# Patient Record
Sex: FEMALE | Race: WHITE | NOT HISPANIC OR LATINO | Employment: FULL TIME | URBAN - METROPOLITAN AREA
[De-identification: names, ages, dates, MRNs, and addresses within clinical notes are randomized per-mention and may not be internally consistent; named-entity substitution may affect disease eponyms.]

---

## 2017-01-17 ENCOUNTER — OFFICE VISIT - RIVER FALLS (OUTPATIENT)
Dept: FAMILY MEDICINE | Facility: CLINIC | Age: 27
End: 2017-01-17

## 2017-01-17 ENCOUNTER — COMMUNICATION - RIVER FALLS (OUTPATIENT)
Dept: FAMILY MEDICINE | Facility: CLINIC | Age: 27
End: 2017-01-17

## 2017-01-17 ASSESSMENT — MIFFLIN-ST. JEOR: SCORE: 1681.58

## 2017-09-20 ENCOUNTER — OFFICE VISIT - RIVER FALLS (OUTPATIENT)
Dept: FAMILY MEDICINE | Facility: CLINIC | Age: 27
End: 2017-09-20

## 2017-09-20 ASSESSMENT — MIFFLIN-ST. JEOR: SCORE: 1692.47

## 2017-09-21 LAB
CHOLEST SERPL-MCNC: 205 MG/DL
CHOLEST/HDLC SERPL: 3.5 {RATIO}
HBA1C MFR BLD: 5 %
HDLC SERPL-MCNC: 59 MG/DL
LDLC SERPL CALC-MCNC: 130 MG/DL
NONHDLC SERPL-MCNC: 146 MG/DL
TRIGL SERPL-MCNC: 66 MG/DL

## 2018-01-10 ENCOUNTER — OFFICE VISIT - RIVER FALLS (OUTPATIENT)
Dept: FAMILY MEDICINE | Facility: CLINIC | Age: 28
End: 2018-01-10

## 2018-01-10 ASSESSMENT — MIFFLIN-ST. JEOR: SCORE: 1726.03

## 2018-10-17 ENCOUNTER — OFFICE VISIT - RIVER FALLS (OUTPATIENT)
Dept: FAMILY MEDICINE | Facility: CLINIC | Age: 28
End: 2018-10-17

## 2019-10-25 ENCOUNTER — AMBULATORY - RIVER FALLS (OUTPATIENT)
Dept: FAMILY MEDICINE | Facility: CLINIC | Age: 29
End: 2019-10-25

## 2019-11-21 ENCOUNTER — OFFICE VISIT - RIVER FALLS (OUTPATIENT)
Dept: FAMILY MEDICINE | Facility: CLINIC | Age: 29
End: 2019-11-21

## 2019-11-21 ASSESSMENT — MIFFLIN-ST. JEOR: SCORE: 1728.41

## 2020-07-24 ENCOUNTER — OFFICE VISIT - RIVER FALLS (OUTPATIENT)
Dept: FAMILY MEDICINE | Facility: CLINIC | Age: 30
End: 2020-07-24

## 2020-11-13 ENCOUNTER — AMBULATORY - RIVER FALLS (OUTPATIENT)
Dept: FAMILY MEDICINE | Facility: CLINIC | Age: 30
End: 2020-11-13

## 2021-05-14 ENCOUNTER — OFFICE VISIT - RIVER FALLS (OUTPATIENT)
Dept: FAMILY MEDICINE | Facility: CLINIC | Age: 31
End: 2021-05-14

## 2021-05-14 ASSESSMENT — MIFFLIN-ST. JEOR: SCORE: 1718.43

## 2021-05-17 ENCOUNTER — COMMUNICATION - RIVER FALLS (OUTPATIENT)
Dept: FAMILY MEDICINE | Facility: CLINIC | Age: 31
End: 2021-05-17

## 2021-05-17 ENCOUNTER — TRANSFERRED RECORDS (OUTPATIENT)
Dept: HEALTH INFORMATION MANAGEMENT | Facility: CLINIC | Age: 31
End: 2021-05-17

## 2021-05-17 LAB — HPV ABSTRACT: NORMAL

## 2022-02-11 VITALS
WEIGHT: 230.8 LBS | DIASTOLIC BLOOD PRESSURE: 88 MMHG | SYSTOLIC BLOOD PRESSURE: 142 MMHG | HEART RATE: 92 BPM | HEIGHT: 63 IN | TEMPERATURE: 99.6 F | BODY MASS INDEX: 40.89 KG/M2

## 2022-02-11 VITALS
HEIGHT: 63 IN | WEIGHT: 221 LBS | OXYGEN SATURATION: 99 % | DIASTOLIC BLOOD PRESSURE: 86 MMHG | SYSTOLIC BLOOD PRESSURE: 110 MMHG | TEMPERATURE: 98.7 F | BODY MASS INDEX: 39.16 KG/M2 | HEART RATE: 87 BPM

## 2022-02-11 VITALS
BODY MASS INDEX: 40.75 KG/M2 | TEMPERATURE: 98.1 F | OXYGEN SATURATION: 97 % | SYSTOLIC BLOOD PRESSURE: 134 MMHG | HEIGHT: 63 IN | DIASTOLIC BLOOD PRESSURE: 86 MMHG | HEART RATE: 106 BPM | WEIGHT: 230 LBS

## 2022-02-11 VITALS
SYSTOLIC BLOOD PRESSURE: 140 MMHG | WEIGHT: 232.2 LBS | TEMPERATURE: 99.2 F | HEART RATE: 97 BPM | DIASTOLIC BLOOD PRESSURE: 90 MMHG | BODY MASS INDEX: 41.13 KG/M2 | OXYGEN SATURATION: 98 %

## 2022-02-11 VITALS
SYSTOLIC BLOOD PRESSURE: 124 MMHG | DIASTOLIC BLOOD PRESSURE: 82 MMHG | WEIGHT: 232.2 LBS | BODY MASS INDEX: 41.14 KG/M2 | HEART RATE: 110 BPM | HEIGHT: 63 IN | TEMPERATURE: 99.3 F | OXYGEN SATURATION: 97 %

## 2022-02-11 VITALS
HEART RATE: 95 BPM | TEMPERATURE: 99.4 F | BODY MASS INDEX: 39.58 KG/M2 | SYSTOLIC BLOOD PRESSURE: 130 MMHG | HEIGHT: 63 IN | WEIGHT: 223.4 LBS | DIASTOLIC BLOOD PRESSURE: 88 MMHG

## 2022-02-11 VITALS
BODY MASS INDEX: 38.75 KG/M2 | OXYGEN SATURATION: 97 % | WEIGHT: 217 LBS | HEART RATE: 104 BPM | TEMPERATURE: 98.9 F | DIASTOLIC BLOOD PRESSURE: 80 MMHG | SYSTOLIC BLOOD PRESSURE: 140 MMHG

## 2022-02-16 NOTE — NURSING NOTE
Comprehensive Intake Entered On:  11/21/2019 4:19 PM CST    Performed On:  11/21/2019 4:16 PM CST by Sindy Villegas LPN               Summary   Chief Complaint :   here today for annual exam   Weight Measured :   232.2 lb(Converted to: 232 lb 3 oz, 105.32 kg)    Height Measured :   62.75 in(Converted to: 5 ft 3 in, 159.38 cm)    Body Mass Index :   41.46 kg/m2 (HI)    Body Surface Area :   2.16 m2   Systolic Blood Pressure :   124 mmHg   Diastolic Blood Pressure :   82 mmHg (HI)    Mean Arterial Pressure :   96 mmHg   Peripheral Pulse Rate :   110 bpm (HI)    BP Site :   Right arm   BP Method :   Manual   Temperature Tympanic :   99.3 DegF(Converted to: 37.4 DegC)    Oxygen Saturation :   97 %   Sindy Villegas LPN - 11/21/2019 4:16 PM CST   Health Status   Allergies Verified? :   Yes   Medication History Verified? :   Yes   Medical History Verified? :   No   Pre-Visit Planning Status :   Completed   Tobacco Use? :   Never smoker   Sindy Villegas LPN - 11/21/2019 4:16 PM CST   Meds / Allergies   (As Of: 11/21/2019 4:19:52 PM CST)   Allergies (Active)   No known allergies  Estimated Onset Date:   Unspecified ; Created By:   Sherin Flanagan CMA; Reaction Status:   Active ; Category:   Drug ; Substance:   No known allergies ; Type:   Allergy ; Updated By:   Sherin Flanagan CMA; Reviewed Date:   10/17/2018 4:02 PM CDT      No Known Medication Allergies  Estimated Onset Date:   Unspecified ; Created By:   Sherin Flanagan CMA; Reaction Status:   Active ; Category:   Drug ; Substance:   No Known Medication Allergies ; Type:   Allergy ; Updated By:   Sherin Flanagan CMA; Reviewed Date:   10/17/2018 4:02 PM CDT        Medication List   (As Of: 11/21/2019 4:19:52 PM CST)   Prescription/Discharge Order    loratadine  :   loratadine ; Status:   Prescribed ; Ordered As Mnemonic:   loratadine 10 mg oral capsule ; Simple Display Line:   10 mg, 1 cap(s), po, daily, 30 cap(s), 3 Refill(s) ; Ordering Provider:    Alana Higgins; Catalog Code:   loratadine ; Order Dt/Tm:   5/7/2018 6:40:48 PM CDT          albuterol  :   albuterol ; Status:   Prescribed ; Ordered As Mnemonic:   Ventolin HFA 90 mcg/inh inhalation aerosol ; Simple Display Line:   2 puff(s), inh, qid, she will call before picking up, 18 gm, 2 Refill(s) ; Ordering Provider:   Alana Higgins; Catalog Code:   albuterol ; Order Dt/Tm:   10/17/2018 4:25:54 PM CDT          budesonide-formoterol  :   budesonide-formoterol ; Status:   Prescribed ; Ordered As Mnemonic:   Symbicort 160 mcg-4.5 mcg/inh inhalation aerosol ; Simple Display Line:   See Instructions, INHALE TWO PUFFS BY MOUTH TWICE DAILY, 3 EA, 4 Refill(s) ; Ordering Provider:   Alana Higgins; Catalog Code:   budesonide-formoterol ; Order Dt/Tm:   10/17/2018 4:25:17 PM CDT            Home Meds    levonorgestrel  :   levonorgestrel ; Status:   Documented ; Ordered As Mnemonic:   Mirena 52 mg intrauteral device ; Simple Display Line:   52 mg, 1 EA, intrauteral, once, 0 Refill(s) ; Catalog Code:   levonorgestrel ; Order Dt/Tm:   3/25/2016 4:50:32 PM CDT

## 2022-02-16 NOTE — PROCEDURES
Accession Number:       [id number]  CLINICAL INFORMATION::     Intrauterine contraceptive device MIRENA  LMP::     IUD  PREV. PAP::     09/20/2017 WNL  PREV. BX::     NONE GIVEN  SOURCE::     None given  STATEMENT OF ADEQUACY::     Satisfactory for evaluation. Endocervical/transformation zone component present. Age and/or menstrual status not provided  INTERPRETATION/RESULT::     Negative for intraepithelial lesion or malignancy.  COMMENT::     This Pap test has been evaluated with computer assisted technology.  CYTOTECHNOLOGIST::     TONO TERESA(ASCP) CT Screening location: [place] [address]  COMMENT:     See comment       EXPLANATORY NOTE:         The Pap is a screening test for cervical cancer. It is       not a diagnostic test and is subject to false negative       and false positive results. It is most reliable when a       satisfactory sample, regularly obtained, is submitted       with relevant clinical findings and history, and when       the Pap result is evaluated along with historic and       current clinical information.  HPV mRNA E6/E7:     Not Detected       Methodology: Transcription-Mediated Amplification       This assay detects E6/E7 viral messenger RNA (mRNA) from 14       high-risk HPV types (16,18,31,33,35,39,45,51,52,56,58,59,66,68).           The analytical performance characteristics of this       assay have been determined by [employer].       The modifications have not been cleared or approved       by the FDA. This assay has been validated pursuant       to the CLIA regulations and is used for       clinical purposes.         For additional information, please refer to       [email or web address]/SIH106q1       (This link if provided for information/       educational purposes only.)

## 2022-02-16 NOTE — NURSING NOTE
Comprehensive Intake Entered On:  7/24/2020 12:56 PM CDT    Performed On:  7/24/2020 12:50 PM CDT by Claudia Gonsalves CMA               Summary   Chief Complaint :   c/o lumps under both breast present for a few months.     Weight Measured :   217.0 lb(Converted to: 217 lb 0 oz, 98.43 kg)    Systolic Blood Pressure :   140 mmHg (HI)    Diastolic Blood Pressure :   80 mmHg   Mean Arterial Pressure :   100 mmHg   Peripheral Pulse Rate :   104 bpm (HI)    BP Site :   Right arm   BP Method :   Manual   HR Method :   Electronic   Temperature Tympanic :   98.9 DegF(Converted to: 37.2 DegC)    Oxygen Saturation :   97 %   Claudia Gonsalves CMA - 7/24/2020 12:50 PM CDT   Health Status   Allergies Verified? :   Yes   Medication History Verified? :   Yes   Pre-Visit Planning Status :   N/A   Tobacco Use? :   Never smoker   Claudia Gonsalves CMA - 7/24/2020 12:50 PM CDT   Consents   Consent for Immunization Exchange :   Consent Granted   Consent for Immunizations to Providers :   Consent Granted   Claudia Gonsalves CMA - 7/24/2020 12:50 PM CDT   Problems   (As Of: 7/24/2020 12:56:05 PM CDT)   Problems(Active)    Asthma, mild persistent (SNOMED CT  :2183515216 )  Name of Problem:   Asthma, mild persistent ; Recorder:   Alana Higgins; Confirmation:   Confirmed ; Classification:   Medical ; Code:   [id number] ; Contributor System:   [employer] ; Last Updated:   10/17/2018 4:36 PM CDT ; Life Cycle Status:   Active ; Responsible Provider:   Alana Higgins; Vocabulary:   SNOMED CT        Seasonal allergies (SNOMED CT  :378846080 )  Name of Problem:   Seasonal allergies ; Recorder:   Traci Ceron; Confirmation:   Confirmed ; Classification:   Medical ; Code:   [id number] ; Contributor System:   [employer] ; Last Updated:   9/30/2015 12:26 PM CDT ; Life Cycle Date:   9/30/2015 ; Life Cycle Status:   Active ; Vocabulary:   SNOMED CT        Severe obesity (BMI >= 40) (SNOMED CT  :629998584 )  Name of Problem:   Severe obesity (BMI >= 40)  ; Recorder:   Alana Higgins; Confirmation:   Confirmed ; Classification:   Medical ; Code:   [id number] ; Contributor System:   [employer] ; Last Updated:   10/17/2018 4:36 PM CDT ; Life Cycle Status:   Active ; Responsible Provider:   Alana Higgins; Vocabulary:   SNOMED CT          Meds / Allergies   (As Of: 7/24/2020 12:56:05 PM CDT)   Allergies (Active)   No Known Medication Allergies  Estimated Onset Date:   Unspecified ; Created By:   Sherin Flanagan CMA; Reaction Status:   Active ; Category:   Drug ; Substance:   No Known Medication Allergies ; Type:   Allergy ; Updated By:   Sherin Flanagan CMA; Reviewed Date:   10/17/2018 4:02 PM CDT        Medication List   (As Of: 7/24/2020 12:56:05 PM CDT)   Prescription/Discharge Order    albuterol  :   albuterol ; Status:   Prescribed ; Ordered As Mnemonic:   Ventolin HFA 90 mcg/inh inhalation aerosol ; Simple Display Line:   2 puff(s), inh, qid, she will call before picking up, 18 gm, 3 Refill(s) ; Ordering Provider:   Alana Higgins; Catalog Code:   albuterol ; Order Dt/Tm:   11/21/2019 4:29:18 PM CST          budesonide-formoterol  :   budesonide-formoterol ; Status:   Prescribed ; Ordered As Mnemonic:   Symbicort 160 mcg-4.5 mcg/inh inhalation aerosol ; Simple Display Line:   See Instructions, INHALE TWO PUFFS BY MOUTH TWICE DAILY, 3 EA, 4 Refill(s) ; Ordering Provider:   Alana Higgins; Catalog Code:   budesonide-formoterol ; Order Dt/Tm:   11/21/2019 4:28:27 PM CST          loratadine  :   loratadine ; Status:   Prescribed ; Ordered As Mnemonic:   loratadine 10 mg oral capsule ; Simple Display Line:   10 mg, 1 cap(s), po, daily, 30 cap(s), 3 Refill(s) ; Ordering Provider:   Alana Higgins; Catalog Code:   loratadine ; Order Dt/Tm:   5/7/2018 6:40:48 PM CDT            Home Meds    levonorgestrel  :   levonorgestrel ; Status:   Documented ; Ordered As Mnemonic:   Mirena 52 mg intrauteral device ; Simple Display Line:   52 mg, 1 EA,  intrauteral, once, 0 Refill(s) ; Catalog Code:   levonorgestrel ; Order Dt/Tm:   3/25/2016 4:50:32 PM CDT            ID Risk Screen   Recent Travel History :   No recent travel   Family Member Travel History :   No recent travel   Other Exposure to Infectious Disease :   Unknown   Claudia Gonsalves CMA - 7/24/2020 12:50 PM CDT

## 2022-02-16 NOTE — LETTER
(Inserted Image. Unable to display)     November 23, 2020      ADARSH DELATORRE   [address] [place] [id number]          Dear ADARSH,      Thank you for selecting [facility] ([facility]viously [facility]) for your healthcare needs.    Our records indicate you are due for the following services:     Annual Physical and Gynecologic Exam ~ Yearly wellness exams are important for your ongoing health and wellness.  This exam gives you the opportunity to meet with your health care provider to review your health, update immunizations and to recommend preventive screenings that you may be due for.  At your wellness visit, your Healthcare Provider will determine the need for a gynecologic exam and/or Pap smear.    (FYI   Regarding office visits: In some instances, a video visit or telephone visit may be offered as an option.)      To schedule an appointment or if you have further questions, please contact your primary clinic:   [facility]       [phone]   [facility]       [phone]              [facility]     [phone]      Powered by [employer]    Sincerely,    VAISHALI Calix

## 2022-02-16 NOTE — PROGRESS NOTES
Chief Complaint    c/o lumps under both breast present for a few months.  History of Present Illness      rash started a few months ago      tried some over the counter creams without relief      no recent trips, no changes in laundry products or cleaning products,   no  new pets,   no contacts with people with similar condition,  no new lotions or creams,   no recent camping trips      it does not itch      it is not spreading      no fevers, chills, sore throat, runny nose, nausea, vomiting, constipation, no new skin lesions, chest pain, palpitations, slurred speech, new paresthesia, shortness of breath or wheeze.      located along the underside of both breasts      she also has seasonal allergies and would like a refill of her antihistamine      Review of systems is negative except as per HPI      Exam:      General: alert and oriented ×3 no acute distress.      HEENT: pupils are equal round and reactive to light extraocular motion is intact. Normocephalic and atraumatic.       Hearing is grossly normal and there is no otorrhea.       Nares are patent there is no rhinorrhea.       Mucous membranes are moist and pink.      Chest: has bilateral rise with no increased work of breathing.      Cardiovascular: normal perfusion and brisk capillary refill.      Musculoskeletal: no gross focal abnormalities and normal gait.      Neuro: no gross focal abnormalities and memory seems intact.      Psychiatric: speech is clear and coherent and fluent. Patient dressed appropriately for the weather. Mood is appropriate and affect is full.      Skin:      a few small furuncles at the pores on the underside of the breast, some scarring from previous furruncles      Education:  discussed with patient diagnosis.    also see assessment and plan below.   Patient should return to clinic if symptoms worsen or do not improve, and as needed for next annual exam.   Physical Exam   Vitals & Measurements    T: 98.9   F (Tympanic)  HR:  104(Peripheral)  BP: 140/80  SpO2: 97%     WT: 217.0 lb   Assessment/Plan       Folliculitis (N64.89)         Ordered:          clindamycin topical, 1 tessa, Topical, bid, # 60 mL, 11 Refill(s), Type: Maintenance, Pharmacy: [facility] #[id number], 1 tessa Topical bid, 62.75, in, 11/21/19 16:16:00 CST, Height Measured, 217, lb, 07/24/20 12:50:00 CDT, Weight Measured, (Ordered)          [id number] office outpatient visit 15 minutes (Charge), Quantity: 1, Folliculitis  Seasonal allergies                Seasonal allergies (J30.2)         Ordered:          27636 office outpatient visit 15 minutes (Charge), Quantity: 1, Folliculitis  Seasonal allergies                Orders:         loratadine, = 1 cap(s) ( 10 mg ), po, daily, # 30 cap(s), 3 Refill(s), Type: Hard Stop, Pharmacy: [facility] #[id number], (Completed)         loratadine, = 1 cap(s) ( 10 mg ), po, daily, # 90 cap(s), 3 Refill(s), Type: Maintenance, Pharmacy: [facility] #[id number], 1 cap(s) Oral daily, 62.75, in, 11/21/19 16:16:00 CST, Height Measured, 217, lb, 07/24/20 12:50:00 CDT, Weight Measured, (Ordered)      she will try topical antbiotics, if not effective she will rtc, also renewed claritin  Patient Information     Name:BHARGAV DELATORREMERCED LAWS      Address:      [address] [id number]     Sex:Female     YOB: 1990     [phone]     Emergency Contact:GARETT DELATORRE     MRN:[id number]     FIN:[id number]     Location:[facility]     Date of Service:07/24/2020      Primary Care Physician:       Alana Higgins, [phone]      Attending Physician:       Nahomi Silva MD, [phone]  Problem List/Past Medical History    Ongoing     Asthma, mild persistent     Seasonal allergies     Severe obesity (BMI >= 40)    Historical     Pregnancy  Procedure/Surgical History     Single live birth (03/19/2011)  Medications    clindamycin 1% topical solution, 1 tessa, Topical, bid, 11 refills     loratadine 10 mg oral capsule, 10 mg= 1 cap(s), Oral, daily, 3 refills    Mirena 52 mg intrauteral device, 52 mg= 1 EA, Intrauteral, once    Symbicort 160 mcg-4.5 mcg/inh inhalation aerosol, See Instructions, 4 refills    Ventolin HFA 90 mcg/inh inhalation aerosol, 2 puff(s), Inhale, qid, 3 refills  Allergies    No Known Medication Allergies  Social History    Smoking Status - 07/24/2020     Never smoker     Alcohol      Current, Beer (12 oz), 1-2 times per week, 1 drinks/episode average. 2 drinks/episode maximum. Ready to change: No., 11/22/2019     Employment/School      Employed, Work/School description: . Highest education level: Bachelors., 11/22/2019     Exercise      Exercise frequency: Occasional., 11/22/2019     Home/Environment      Marital status: . Spouse/Partner name: Aldo. Jose Enrique children. Living situation: Home/Independent. Injuries/Abuse/Neglect in household: No. Feels unsafe at home: No. Family/Friends available for support: Yes. Risks in environment: owns secured gun., 11/22/2019     Nutrition/Health      Type of diet: Regular. Wants to lose weight: Yes. Sleeping concerns: No. Feels highly stressed: No., 11/22/2019     Sexual      Sexually active: Yes. Identifies as female, Sexual orientation: Straight or heterosexual. History of STD: No. Contraceptive Use Details: Intrauterine device. History of sexual abuse: No., 11/22/2019     Substance Abuse      Never, 02/21/2012     Tobacco      Never, 02/21/2012  Family History    Alcohol Dependence: Mother.    Asthma: Daughter.    Cancer - unknown origin: Mother.    Father: History is negative    Sister: History is negative    Brother: History is negative  Immunizations      Vaccine Date Status          influenza virus vaccine, inactivated 10/25/2019 Given          influenza virus vaccine, inactivated 10/17/2018 Given          tetanus/diphth/pertuss (Tdap) adult/adol 08/27/2014 Given          tetanus/diphth/pertuss (Tdap) adult/adol 08/27/2014  Recorded          influenza 10/26/2010 Recorded          Td 07/27/2004 Recorded          MMR (measles/mumps/rubella) 08/28/2001 Recorded          OPV 08/23/1994 Recorded          DTaP 08/23/1994 Recorded          OPV 05/29/1991 Recorded          DTaP 05/29/1991 Recorded          Hib (PRP-T) 10/03/1990 Recorded          MMR (measles/mumps/rubella) 10/03/1990 Recorded          DTaP 1989 Recorded          OPV 1989 Recorded          DTaP 1989 Recorded          OPV 1989 Recorded          DTaP 1989 Recorded

## 2022-02-16 NOTE — PROCEDURES
Accession Number:       [id number]  CLINICAL INFORMATION::     None given  LMP::     06/01/2010  PREV. PAP::     8/27/2014  PREV. BX::     NONE GIVEN  SOURCE::     None given  STATEMENT OF ADEQUACY::     Satisfactory for evaluation. Endocervical/transformation zone component present.  INTERPRETATION/RESULT::     Negative for intraepithelial lesion or malignancy.  COMMENT::     This Pap test has been evaluated with computer assisted technology.  CYTOTECHNOLOGIST::     TONO JORDAN (ASCP) CT Screening location: [place] [address]

## 2022-02-16 NOTE — PROGRESS NOTES
Patient:   ADARSH DELATORRE            MRN: [id number]            FIN: [id number]               Age:   29 years     Sex:  Female     :  1990   Associated Diagnoses:   Well adult; Asthma, mild persistent; Seasonal allergies; Severe obesity (BMI >= 40)   Author:   Alana Higgins      Visit Information      Date of Service: 2019 03:54 pm  Performing Location: [facility]  Encounter#: [id number]      Chief Complaint   2019 4:16 PM CST   here today for annual exam        Well Adult History   Well Adult History             The patient presents for well adult exam, doing well\  , parenting 8 yr old daughter and works as   pap normal 2 years ago, will defer  had flu shot  has asthma, well controlled, uses Symbicort daily  ACT 21, see AAP, rreviewed today, hasnt used prednisone since childhood  still struggles with wt, has not gained this pas year, enjoyed water aerobics this summer.  The general health status is good.  The patient's diet is described as balanced.  Exercise: none.  Associated symptoms consist of none.  Last menstrual period: rare menses with IUD in place, was placed .  Medical encounters:.  Additional pertinent history: tobacco use none.        Review of Systems   Constitutional:  Negative except as documented in history of present illness.    Eye:  Negative except as documented in history of present illness.    Respiratory:  Negative except as documented in history of present illness.    Cardiovascular:  Negative except as documented in history of present illness.    Breast:  Negative.    Gastrointestinal:  Negative except as documented in history of present illness.    Genitourinary:  Negative except as documented in history of present illness.    Gynecologic:  Negative except as documented in history of present illness.    Hematology/Lymphatics:  Negative except as documented in history of present illness.    Endocrine:  Negative except as  documented in history of present illness.    Immunologic:  Negative except as documented in history of present illness.    Musculoskeletal:  Negative except as documented in history of present illness.    Integumentary:  Negative except as documented in history of present illness.    Neurologic:  Negative except as documented in history of present illness.    Psychiatric:  Negative except as documented in history of present illness.              Health Status   Allergies:    Allergic Reactions (Selected)  No known allergies  No Known Medication Allergies   Medications:  (Selected)   Prescriptions  Prescribed  Symbicort 160 mcg-4.5 mcg/inh inhalation aerosol: See Instructions, Instructions: INHALE TWO PUFFS BY MOUTH TWICE DAILY, # 3 EA, 4 Refill(s), Type: Soft Stop, Pharmacy: [facility] #[id number], INHALE TWO PUFFS BY MOUTH TWICE DAILY  Ventolin HFA 90 mcg/inh inhalation aerosol: 2 puff(s), inh, qid, Instructions: she will call before picking up, # 18 gm, 3 Refill(s), Type: Maintenance, Pharmacy: [facility] #[id number], 2 puff(s) Inhale qid,Instr:she will call before picking up  loratadine 10 mg oral capsule: 1 cap(s) ( 10 mg ), po, daily, # 30 cap(s), 3 Refill(s), Type: Maintenance, Pharmacy: [facility] #[id number], 1 cap(s) po daily  Documented Medications  Documented  Mirena 52 mg intrauteral device: 1 EA ( 52 mg ), intrauteral, once, 0 Refill(s), Type: Maintenance   Problem list:    All Problems  Asthma, mild persistent / SNOMED CT 5917453626 / Confirmed  Seasonal allergies / SNOMED CT 762393547 / Confirmed  Severe obesity (BMI >= 40) / SNOMED CT 010363178 / Confirmed  Resolved: Pregnancy / SNOMED CT 701257100  Canceled: Asthma / SNOMED CT 260212710  Canceled: Asthma, mild intermittent / SNOMED CT 7716378232  Canceled: Obesity / SNOMED CT 1171604524      Histories   Past Medical History:    Active  Seasonal allergies (281012990)  Resolved  Pregnancy (408830698):  Resolved on 3/19/2011 at 20 years.    Family History:    Mother: Selena  ()  Cause of Death: cancer  Age at Death: 34 years.   Alcohol Dependence  Cancer - unknown origin  Comments:  2016 2:18 PM CDT - Catalina Courtney  Leiomyosarcoma of uterus  Father: Jomar      History is negative.  Sister: Ally      History is negative.  Brother: Nick      History is negative.  Daughter: Agnieszka    Asthma     Procedure history:    Single live birth (SNOMED CT 258953172) on 3/19/2011 at 20 Years.   Social History:        Alcohol Assessment            Current, 1-2 times per week, 1 drinks/episode average.  2 drinks/episode maximum.      Tobacco Assessment            Never      Substance Abuse Assessment            Never      Employment and Education Assessment            Employed, Work/School description: .      Home and Environment Assessment            Marital status: .  Spouse/Partner name: Aldo.  Risks in environment: owns secured gun.      Nutrition and Health Assessment            Type of diet: Regular.      Exercise and Physical Activity Assessment            Exercise frequency: Never.      Sexual Assessment            Sexually active: Yes.  Sexual orientation: Heterosexual.  Other contraceptive use: IUD.        Physical Examination   Vital Signs   2019 4:16 PM CST Temperature Tympanic 99.3 DegF    Peripheral Pulse Rate 110 bpm  HI    Systolic Blood Pressure 124 mmHg    Diastolic Blood Pressure 82 mmHg  HI    Mean Arterial Pressure 96 mmHg    BP Site Right arm    BP Method Manual    Oxygen Saturation 97 %      Measurements from flowsheet : Measurements   2019 4:16 PM CST Height Measured - Standard 62.75 in    Weight Measured - Standard 232.2 lb    BSA 2.16 m2    Body Mass Index 41.46 kg/m2  HI      General:  Alert and oriented, No acute distress, vital signs stable, as noted above.    Eye:  Pupils are equal, round and reactive to light, Extraocular movements are intact, Normal conjunctiva.    HENT:  Normocephalic,  Tympanic membranes are clear, Normal hearing, Oral mucosa is moist, No pharyngeal erythema.    Neck:  Supple, Non-tender, No lymphadenopathy, No thyromegaly.    Respiratory:  Lungs are clear to auscultation, Respirations are non-labored, Breath sounds are equal, Symmetrical chest wall expansion.    Cardiovascular:  Normal rate, Regular rhythm, No murmur, No edema.    Breast:  No mass, No tenderness, No discharge, Breasts examined .  No infra nor supraclavicular nodes palpable.  No axillary nodes or masses palpable.  No nipple discharge. Breasts normal throughout.    Gastrointestinal:  Soft, Non-tender, Non-distended, No organomegaly.    Musculoskeletal:  Normal range of motion, Normal strength, No deformity, Normal gait.    Integumentary:  Warm, Dry, Pink, Intact, No rash.    Neurologic:  Alert, Oriented, Normal sensory, Normal motor function, Cranial Nerves II-XII are grossly intact.    Psychiatric:  Cooperative, Appropriate mood & affect, Normal judgment, PHQ 9/CAGE questionaire reviewed and discussed with patient,  see score.       Impression and Plan   Diagnosis     Well adult (JJL27-YB Z00.00).     Asthma, mild persistent (SKP92-LO J45.30).     Seasonal allergies (FSF14-EC J30.2).     Severe obesity (BMI >= 40) (GKX47-JD E66.01).     Patient Instructions:       Counseled: Patient, Regarding diagnosis, Regarding medications, Verbalized understanding, counseled on health benefits of healthy weight, regular exercise, healthy diet, additional 10 min in counseling regarding asthma.    Orders     Orders (Selected)   Prescriptions  Prescribed  Symbicort 160 mcg-4.5 mcg/inh inhalation aerosol: See Instructions, Instructions: INHALE TWO PUFFS BY MOUTH TWICE DAILY, # 3 EA, 4 Refill(s), Type: Soft Stop, Pharmacy: [facility] #[id number], INHALE TWO PUFFS BY MOUTH TWICE DAILY  Ventolin HFA 90 mcg/inh inhalation aerosol: 2 puff(s), inh, qid, Instructions: she will call before picking up, # 18 gm, 3 Refill(s), Type:  Maintenance, Pharmacy: [facility] #[id number], 2 puff(s) Inhale qid,Instr:she will call before picking up.

## 2022-02-16 NOTE — NURSING NOTE
Asthma Control Test (ACT) Total Entered On:  5/14/2021 3:18 PM CDT    Performed On:  5/14/2021 3:16 PM CDT by Sindy Villegas LPN               Asthma Control Test (ACT) Total   Asthma Control Test Total (Adult) :   22    Asthma Action Plan Provided? :   No   Sindy Villegas LPN - 5/14/2021 3:16 PM CDT

## 2022-02-16 NOTE — TELEPHONE ENCOUNTER
Entered by Aletha Soler CMA on November 30, 2020 9:42:25 AM CST  ---------------------  From: Aletha Soler CMA   To: [facility] #[id number]    Sent: 11/30/2020 9:42:25 AM CST  Subject: Medication Management     ** Submitted: **  Order:budesonide-formoterol (Symbicort 160 mcg-4.5 mcg/inh inhalation aerosol)  See Instructions  INHALE 2 PUFFS BY MOUTH TWICE DAILY  Qty:  30.6 gm        Days Supply:  90        Refills:  0          Substitutions Allowed     Route To [facility] #[id number]    Signed by Aletha Soler CMA  11/30/2020 3:41:00 PM [facility]    ** Submitted: **  Complete:budesonide-formoterol (Symbicort 160 mcg-4.5 mcg/inh inhalation aerosol)   Signed by Aletha Soler CMA  11/30/2020 3:42:00 PM [facility]    ** Not Approved:  **  budesonide-formoterol (SYMBICORT 160/4.5MCG (120 ORAL INH))  INHALE 2 PUFFS BY MOUTH TWICE DAILY  Qty:  30.6 gm        Days Supply:  90        Refills:  0          Substitutions Allowed     Route To [facility] #[id number]   Signed by Aletha Soler CMA            ------------------------------------------  From: [facility] #[id number]  To: Alana Higgins  Sent: November 27, 2020 4:52:46 PM CST  Subject: Medication Management  Due: November 26, 2020 4:59:30 PM CST     ** On Hold Pending Signature **     Dispensed Drug: budesonide-formoterol (Symbicort 160 mcg-4.5 mcg/inh inhalation aerosol), INHALE 2 PUFFS BY MOUTH TWICE DAILY  Quantity: 30.6 gm  Days Supply: 90  Refills: 0  Substitutions Allowed  Notes from Pharmacy:  ------------------------------------------1 month refill sent per protocol. Pt was mailed reminder letter 11/23/20.  Aletha Soler CMA.

## 2022-02-16 NOTE — NURSING NOTE
Asthma Control Test (ACT) Total Entered On:  11/21/2019 5:00 PM CST    Performed On:  11/21/2019 5:00 PM CST by Sindy Villegas LPN               Asthma Control Test (ACT) Total   Asthma Control Test Total (Adult) :   21    Asthma Action Plan Provided? :   Yes   Sindy Villegas LPN - 11/21/2019 5:00 PM CST

## 2022-02-16 NOTE — PROGRESS NOTES
Patient:   ADARSH DELATORRE            MRN: [id number]            FIN: [id number]               Age:   28 years     Sex:  Female     :  1990   Associated Diagnoses:   Well adult; Asthma, mild persistent; Severe obesity (BMI >= 40)   Author:   Alana Higgins      Chief Complaint   10/17/2018 3:59 PM CDT   px,        Well Adult History   Well Adult History             The patient presents for well adult exam, , parenting 8 yr old  happy with Mirena  pap can be deferred  weight climbing, bp elevated  asthma in great control, see ACT and AAP, she has great understanding of how to keep controlled  flu shot today.  The general health status is good.  The patient's diet is described as balanced.  Exercise: none.  Associated symptoms consist of weight gain.  Last menstrual period: mirena, declines string check.  Medical encounters:.  Additional pertinent history: tobacco use none.        Review of Systems   Constitutional:  Negative except as documented in history of present illness.    Eye:  Negative except as documented in history of present illness.    Respiratory:  Negative except as documented in history of present illness.    Cardiovascular:  Negative except as documented in history of present illness.    Breast:  Negative.    Gastrointestinal:  Negative except as documented in history of present illness.    Genitourinary:  Negative except as documented in history of present illness.    Gynecologic:  Negative except as documented in history of present illness.    Hematology/Lymphatics:  Negative except as documented in history of present illness.    Endocrine:  Negative except as documented in history of present illness.    Immunologic:  Negative except as documented in history of present illness.    Musculoskeletal:  Negative except as documented in history of present illness.    Integumentary:  Negative except as documented in history of present illness.    Neurologic:  Negative except as  documented in history of present illness.    Psychiatric:  Negative except as documented in history of present illness.              Health Status   Allergies:    Allergic Reactions (Selected)  No known allergies  No Known Medication Allergies   Medications:  (Selected)   Prescriptions  Prescribed  Symbicort 160 mcg-4.5 mcg/inh inhalation aerosol: See Instructions, Instructions: INHALE TWO PUFFS BY MOUTH TWICE DAILY, # 3 EA, 4 Refill(s), Type: Soft Stop, Pharmacy: [facility] #[id number], INHALE TWO PUFFS BY MOUTH TWICE DAILY  Ventolin HFA 90 mcg/inh inhalation aerosol: 2 puff(s), inh, qid, Instructions: she will call before picking up, # 18 gm, 2 Refill(s), Type: Maintenance, Pharmacy: [facility] #[id number], 2 puff(s) Inhale qid,Instr:she will call before picking up  loratadine 10 mg oral capsule: 1 cap(s) ( 10 mg ), po, daily, # 30 cap(s), 3 Refill(s), Type: Maintenance, Pharmacy: [facility] #[id number], 1 cap(s) po daily  Documented Medications  Documented  Mirena 52 mg intrauteral device: 1 EA ( 52 mg ), intrauteral, once, 0 Refill(s), Type: Maintenance   Problem list:    All Problems  Obesity / SNOMED CT 1230152929 / Probable  Seasonal allergies / SNOMED CT 117546713 / Confirmed  Asthma, mild intermittent / SNOMED CT 3675536712 / Confirmed  Resolved: Pregnancy / SNOMED CT 385788006  Canceled: Asthma / SNOMED CT 661609817      Histories   Past Medical History:    Active  Obesity (0984272978)  Seasonal allergies (134264602)  Resolved  Pregnancy (121002547):  Resolved on 3/19/2011 at 20 years.   Family History:    Mother: Selena  ()  Cause of Death: cancer  Age at Death: 34 years.   Alcohol Dependence  Cancer - unknown origin  Comments:  2016 2:18 PM - Catalina Courtney  Leiomyosarcoma of uterus  Father: Jomar      History is negative.  Sister: Ally      History is negative.  Brother: Nick      History is negative.  Daughter: Agnieszka    Asthma     Procedure history:    Single live birth (SNOMED CT  268806513) on 3/19/2011 at 20 Years.      Physical Examination   Vital Signs   10/17/2018 3:59 PM CDT Temperature Tympanic 99.2 DegF    Peripheral Pulse Rate 97 bpm    HR Method Electronic    Systolic Blood Pressure 140 mmHg  HI    Diastolic Blood Pressure 90 mmHg  HI    Mean Arterial Pressure 107 mmHg    BP Site Right arm    BP Method Manual    Oxygen Saturation 98 %      Measurements from flowsheet : Measurements   10/17/2018 3:59 PM CDT   Weight Measured - Standard                232.2 lb     General:  Alert and oriented, No acute distress, vital signs stable, as noted above.    Eye:  Pupils are equal, round and reactive to light, Extraocular movements are intact, Normal conjunctiva.    HENT:  Normocephalic, Tympanic membranes are clear, Normal hearing, Oral mucosa is moist, No pharyngeal erythema.    Neck:  Supple, Non-tender, No lymphadenopathy, No thyromegaly.    Respiratory:  Lungs are clear to auscultation, Respirations are non-labored, Breath sounds are equal, Symmetrical chest wall expansion.    Cardiovascular:  Normal rate, Regular rhythm, No murmur, No edema.    Gastrointestinal:  Soft, Non-tender, Non-distended, No organomegaly.    Musculoskeletal:  Normal range of motion, Normal strength, No deformity, Normal gait.    Integumentary:  Warm, Dry, Pink, Intact, No rash.    Neurologic:  Alert, Oriented, Normal sensory, Normal motor function, Cranial Nerves II-XII are grossly intact.    Psychiatric:  Cooperative, Appropriate mood & affect, Normal judgment, PHQ 9/CAGE questionaire reviewed and discussed with patient,  see score.       Impression and Plan   Diagnosis     Well adult (ETT74-IH Z00.00).     Asthma, mild persistent (PAY85-SI J45.30).     Severe obesity (BMI >= 40) (CYI72-DT E66.01).     Patient Instructions:       Counseled: Patient, Regarding diagnosis, Regarding medications, Verbalized understanding, counseled on health benefits of healthy weight, regular exercise, healthy diet, i asked her to  work on carb reduction, her  cooks and diet seems healthy but will reduce carbs and portions  Record bp over next month and bring in readings to make sure it does stay elevated, reduce sodium.    Orders     Orders (Selected)   Prescriptions  Prescribed  Symbicort 160 mcg-4.5 mcg/inh inhalation aerosol: See Instructions, Instructions: INHALE TWO PUFFS BY MOUTH TWICE DAILY, # 3 EA, 4 Refill(s), Type: Soft Stop, Pharmacy: [facility] #[id number], INHALE TWO PUFFS BY MOUTH TWICE DAILY  Ventolin HFA 90 mcg/inh inhalation aerosol: 2 puff(s), inh, qid, Instructions: she will call before picking up, # 18 gm, 2 Refill(s), Type: Maintenance, Pharmacy: [facility] #[id number], 2 puff(s) Inhale qid,Instr:she will call before picking up.

## 2022-02-16 NOTE — NURSING NOTE
CAGE Assessment Entered On:  5/14/2021 3:15 PM CDT    Performed On:  5/14/2021 3:15 PM CDT by Sindy Villegas LPN               Assessment   Have you ever felt you should cut down on your drinking :   No   Have people annoyed you by criticizing your drinking :   No   Have you ever felt bad or guilty about your drinking :   No   Have you ever taken a drink first thing in the morning to steady your nerves or get rid of a hangover (Eye-opener) :   No   CAGE Score :   0    Sindy Villegas LPN - 5/14/2021 3:15 PM CDT

## 2022-02-16 NOTE — PROGRESS NOTES
Patient:   ADARSH DELATORRE            MRN: [id number]            FIN: [id number]               Age:   26 years     Sex:  Female     :  1990   Associated Diagnoses:   None   Author:   Yash Montelongo MD      Visit Information      Date of Service: 2017 10:56 am  Performing Location: [facility]  Encounter#: [id number]      Primary Care Provider (PCP):  Catalina Courtney MD    NPI# [id number]      Referring Provider:  No referring provider recorded for selected visit.      Chief Complaint   2017 11:03 AM CST   Pt c/o sore throat, fever and cough.        History of Present Illness   CC as above and reviewed w  patient   Pt has had above symptoms for several days.       Review of Systems         Resp - no difficulty breathing      Health Status   Allergies:    Allergic Reactions (Selected)  No known allergies  No Known Medication Allergies   Medications:  (Selected)   Prescriptions  Prescribed  Ventolin HFA 90 mcg/inh inhalation aerosol: 2 puff(s), inh, qid, # 18 gm, 2 Refill(s), Type: Maintenance, Pharmacy: [facility] #[id number], 2 puff(s) inh qid  budesonide-formoterol 160 mcg-4.5 mcg/inh inhalation aerosol: 2 puff(s), inh, bid, # 1 EA, 11 Refill(s), Type: Maintenance, Pharmacy: [facility] #[id number], 2 puff(s) inh bid  loratadine 10 mg oral capsule: 1 cap(s) ( 10 mg ), po, daily, # 30 cap(s), 11 Refill(s), Type: Maintenance, Pharmacy: [facility] #[id number], 1 cap(s) po daily  Documented Medications  Documented  Mirena 52 mg intrauteral device: 1 EA ( 52 mg ), intrauteral, once, 0 Refill(s), Type: Maintenance      Histories   Family History:    Asthma  Daughter (Agnieszka)  Cancer - unknown origin  Mother (Selena, )  Comments:  2016 2:18 PM - Catalina Courtney MD  Leiomyosarcoma of uterus  Alcohol Dependence  Mother (Selena, )     Social History:        Alcohol Assessment: Current            1-2 times per week                     Comments:                      10/07/2016 -  Traci Ceron                     2-3 [facility]      Tobacco Assessment: Denies Tobacco Use            Never      Substance Abuse Assessment: Denies Substance Abuse            Never      Employment and Education Assessment            Employed, Work/School description: .      Home and Environment Assessment            Marital status: .  Spouse/Partner name: Aldo.      Exercise and Physical Activity Assessment: Does not exercise      Sexual Assessment            Sexually active: Yes.  Sexual orientation: Heterosexual.  Other contraceptive use: IUD.        Physical Examination   Vital Signs   1/17/2017 11:03 AM CST Temperature Tympanic 98.7 DegF    Peripheral Pulse Rate 87 bpm    Systolic Blood Pressure 110 mmHg    Diastolic Blood Pressure 86 mmHg    Mean Arterial Pressure 94 mmHg    Oxygen Saturation 99 %      Measurements from flowsheet : Measurements   1/17/2017 11:03 AM CST Height Measured - Standard 63 in    Weight Measured - Standard 221 lb    BSA 2.11 m2    Body Mass Index 39.14 kg/m2      Gen - appears well  ENT: External auditory canals clear. TMs normal bilaterally. Normal oropharynx. Moist mucus membranes   Neck - supple w/o adenopathy    CV: RRR w/o MRG. Normal S1 and S2   Resp: Clear to auscultation b/l. Normal breath sounds without wheezes or crackles. No increased work of breathing.       Review / Management   Results review:  Lab results: 1/17/2017 11:13 AM CST   Rapid Strep A             Negative  .       Impression and Plan   Common cold - supportive care

## 2022-02-16 NOTE — PROGRESS NOTES
Patient:   ADARSH DELATORRE            MRN: [id number]            FIN: [id number]               Age:   30 years     Sex:  Female     :  1990   Associated Diagnoses:   Asthma, mild persistent; Encounter for IUD removal; Screening for cervical cancer   Author:   Alana Higgins      Procedure   Pap smear procedure   Date/ Time:  2021 2:46:00 PM.     Confirmed: patient, procedure, safety procedures followed.     Performed by: Alana Higgisn, she would like Mirena replaced, has been a little over 5 years, this will be her 3rd Mirena. her child is 10 years old and she desires continued contraception . . no menses with Mirena. Can feel strings when she checks. Due for pap, have always been normal. use/risk/benefits reviewed.     Informed consent: signed by patient.     Indication: Would like To proceed with IUD insertion.  Reviewed with pt today the risks and benefits of procedure. Risks include infection, uterine perforation, expulsion , embedment in the myometrium and discomfort, .  Common side effects include vaginal bleeding alterations, amenorrhea, intermenstrual bleeding and spotting, abdominal/pelvic discomfort.      Pregnancy is ruled out: IUD in place    With the patient in the lithotomy position, her external genitalia is normal. A bimanual exam is performed to locate the position and size of the uterus and there are no pelvic contraindications.   A sterile speculum is used to enter the vaginal canal.  The os is visualized.  A PAP SMEAR IS OBTAINED> IUD strings are visualized and with steady tension with ring forcepts the IUD is easily removed and fully intact and discarded.   Three sterile Betadine soaked gauze are used to clean the os thoroughly.  The tenaculum is slowly applied near the lip of the cervix  and gentle traction is applied to align the cervical canal with the uterine cavity.  A sterile uterine sound is inserted to check the patency of the cervix, measure the depth of the  uterine cavity, confirm its direction and exclude the presence of any uterine anomaly.  The uterus sounded to ______8.0 cm_________.     The IUD package was open,and the device is loaded into the insertion tube.   The tenaculum is grasped with one hand and with the other hand the insertion tube is gently advance through the cervical canal according to insertion guidelines.   The insertion tube was removed and threads were cut perpendicular with a sterile scissors, leaving about 3 cm visible outside the cervix.   Instruments removed.    The patient tolerated the procedure well and had no acute pain. She sat up and was monitored and given the the patient Follow-up Reminder Card  and was asked to make an appointment in 4-12 weeks to be re evaluated. Reminded regarding bleeding pattern expectations, need for condoms if not in a monogamous relationship and the need for yearly  exams.  .     Procedure tolerated: well.     Complications: none.       She also asked that I refill her asthma meds. Her ACT score is over 20, asthma is well controlled with treating seasonal allergies with loratadine and daily symbicort use. See ACT and AAP      Impression and Plan   Diagnosis     Encounter for IUD insertion (UQJ66-SC Z30.430).     Asthma, mild persistent (FAJ24-DR J45.30).     Encounter for IUD removal (SPO15-SI Z30.432).     Screening for cervical cancer (PGY62-PG Z12.4).     Orders     Orders (Selected)   Outpatient Orders  Ordered (In Transit)  ThinPrep Imaging Pap and HPV mRNA E6/E7* ([facility]): Specimen Type: Pap, Collection Date: 05/14/21 14:18:00 CDT  Prescriptions  Prescribed  Symbicort 160 mcg-4.5 mcg/inh inhalation aerosol: See Instructions, Instructions: INHALE 2 PUFFS BY MOUTH TWICE DAILY, # 3 EA, 3 Refill(s), Type: Maintenance, Pharmacy: [facility] #[id number], INHALE 2 PUFFS BY MOUTH TWICE DAILY, 62.75, in, 05/14/21 13:59:00 CDT, Height Measured, 230, lb, 05/14/...  Ventolin HFA 90 mcg/inh inhalation aerosol: 2  puff(s), inh, qid, # 18 gm, 3 Refill(s), Type: Maintenance, Pharmacy: [facility] #[id number], 2 puff(s) Inhale qid, 62.75, in, 05/14/21 13:59:00 CDT, Height Measured, 230, lb, 05/14/21 13:59:00 CDT, Weight Measured  loratadine 10 mg oral capsule: = 1 cap(s) ( 10 mg ), po, daily, # 90 cap(s), 3 Refill(s), Type: Maintenance, Pharmacy: [facility] #[id number], 1 cap(s) Oral daily, 62.75, in, 05/14/21 13:59:00 CDT, Height Measured, 230, lb, 05/14/21 13:59:00 CDT, Weight Measured.

## 2022-02-16 NOTE — NURSING NOTE
Comprehensive Intake Entered On:  5/14/2021 2:03 PM CDT    Performed On:  5/14/2021 1:59 PM CDT by Sindy Villegas LPN               Summary   Chief Complaint :   here for IUD removal and replacement, originally placed 3/25/2016   Weight Measured :   230 lb(Converted to: 230 lb 0 oz, 104.326 kg)    Height Measured :   62.75 in(Converted to: 5 ft 3 in, 159.38 cm)    Body Mass Index :   41.06 kg/m2 (HI)    Body Surface Area :   2.15 m2   Systolic Blood Pressure :   134 mmHg (HI)    Diastolic Blood Pressure :   86 mmHg (HI)    Mean Arterial Pressure :   102 mmHg   Peripheral Pulse Rate :   106 bpm (HI)    BP Site :   Right arm   BP Method :   Manual   Temperature Tympanic :   98.1 DegF(Converted to: 36.7 DegC)    Oxygen Saturation :   97 %   Sindy Villegas LPN - 5/14/2021 1:59 PM CDT   Health Status   Allergies Verified? :   Yes   Medication History Verified? :   Yes   Medical History Verified? :   No   Pre-Visit Planning Status :   Completed   Tobacco Use? :   Never smoker   Sindy Villegas LPN - 5/14/2021 1:59 PM CDT   Meds / Allergies   (As Of: 5/14/2021 2:03:05 PM CDT)   Allergies (Active)   No Known Medication Allergies  Estimated Onset Date:   Unspecified ; Created By:   Sherin Flanagan CMA; Reaction Status:   Active ; Category:   Drug ; Substance:   No Known Medication Allergies ; Type:   Allergy ; Updated By:   Sherin Flanagan CMA; Reviewed Date:   10/17/2018 4:02 PM CDT        Medication List   (As Of: 5/14/2021 2:03:05 PM CDT)   Prescription/Discharge Order    budesonide-formoterol  :   budesonide-formoterol ; Status:   Prescribed ; Ordered As Mnemonic:   Symbicort 160 mcg-4.5 mcg/inh inhalation aerosol ; Simple Display Line:   See Instructions, INHALE 2 PUFFS BY MOUTH TWICE DAILY, 30.6 gm, 0 Refill(s) ; Ordering Provider:   Alana Higgins; Catalog Code:   budesonide-formoterol ; Order Dt/Tm:   11/30/2020 9:41:43 AM CST          albuterol  :   albuterol ; Status:   Prescribed ; Ordered As  Mnemonic:   Ventolin HFA 90 mcg/inh inhalation aerosol ; Simple Display Line:   2 puff(s), inh, qid, she will call before picking up, 18 gm, 3 Refill(s) ; Ordering Provider:   Alana Higgins; Catalog Code:   albuterol ; Order Dt/Tm:   11/21/2019 4:29:18 PM CST          clindamycin topical  :   clindamycin topical ; Status:   Prescribed ; Ordered As Mnemonic:   clindamycin 1% topical solution ; Simple Display Line:   1 tessa, Topical, bid, 60 mL, 11 Refill(s) ; Ordering Provider:   Nahomi Silva MD; Catalog Code:   clindamycin topical ; Order Dt/Tm:   7/24/2020 1:23:55 PM CDT          loratadine  :   loratadine ; Status:   Prescribed ; Ordered As Mnemonic:   loratadine 10 mg oral capsule ; Simple Display Line:   10 mg, 1 cap(s), po, daily, 90 cap(s), 3 Refill(s) ; Ordering Provider:   Nahomi Silva MD; Catalog Code:   loratadine ; Order Dt/Tm:   7/24/2020 1:25:01 PM CDT            Home Meds    levonorgestrel  :   levonorgestrel ; Status:   Documented ; Ordered As Mnemonic:   Mirena 52 mg intrauteral device ; Simple Display Line:   52 mg, 1 EA, intrauteral, once, 0 Refill(s) ; Catalog Code:   levonorgestrel ; Order Dt/Tm:   3/25/2016 4:50:32 PM CDT            ID Risk Screen   Recent Travel History :   No recent travel   Family Member Travel History :   No recent travel   Other Exposure to Infectious Disease :   Unknown   COVID-19 Testing Status :   No COVID-19 test performed   Sindy Villegas LPN - 5/14/2021 1:59 PM CDT   Social History   Social History   (As Of: 5/14/2021 2:03:05 PM CDT)   Alcohol:        Current, Beer (12 oz), 1-2 times per week, 1 drinks/episode average.  2 drinks/episode maximum.  Ready to change: No.   (Last Updated: 11/22/2019 10:43:43 AM CST by Belén Tena)          Tobacco:        Never (less than 100 in lifetime)   (Last Updated: 5/14/2021 2:00:59 PM CDT by Sindy Villegas LPN)          Electronic Cigarette/Vaping:        Electronic Cigarette Use: Never.   (Last Updated:  5/14/2021 2:01:04 PM CDT by Elieser Villegas LPN          Substance Abuse:        Never   (Last Updated: 2/21/2012 1:12:14 PM CST by Domitila Guzman CMA)          Employment/School:        Employed, Work/School description: .  Highest education level: Bachelors.   (Last Updated: 11/22/2019 10:44:00 AM CST by Belén Tena)          Home/Environment:        Marital status: .  Spouse/Partner name: Aldo.  Jose Enrique children.  Living situation: Home/Independent.  Injuries/Abuse/Neglect in household: No.  Feels unsafe at home: No.  Family/Friends available for support: Yes.  Risks in environment: owns secured gun.   (Last Updated: 11/22/2019 10:44:29 AM CST by Belén Tena)          Nutrition/Health:        Type of diet: Regular.  Wants to lose weight: Yes.  Sleeping concerns: No.  Feels highly stressed: No.   (Last Updated: 11/22/2019 10:44:40 AM CST by Belén Tena)          Exercise:        Exercise frequency: Occasional.   (Last Updated: 11/22/2019 10:50:55 AM CST by Belén Tnea)          Sexual:        Sexually active: Yes.  Identifies as female, Sexual orientation: Straight or heterosexual.  History of STD: No.  Contraceptive Use Details: Intrauterine device.  History of sexual abuse: No.   (Last Updated: 11/22/2019 10:51:33 AM CST by Belén Tena)

## 2022-02-16 NOTE — NURSING NOTE
CAGE Assessment Entered On:  11/21/2019 4:58 PM CST    Performed On:  11/21/2019 4:58 PM CST by Sindy Villegas LPN               Assessment   Have you ever felt you should cut down on your drinking :   No   Have people annoyed you by criticizing your drinking :   No   Have you ever felt bad or guilty about your drinking :   No   Have you ever taken a drink first thing in the morning to steady your nerves or get rid of a hangover (Eye-opener) :   No   CAGE Score :   0    Sindy Villegas LPN - 11/21/2019 4:58 PM CST

## 2022-02-16 NOTE — PROGRESS NOTES
Patient:   ADARSH DELATORRE            MRN: [id number]            FIN: [id number]               Age:   27 years     Sex:  Female     :  1990   Associated Diagnoses:   IUD check up   Author:   Francisco ACEVEDO, Alana      Chief Complaint   1/10/2018 9:52 AM CST    c/o bleeding a lot on  and having cramps ever since then. States that she is worried because she has never had any bleeding with the Mirena that she's been using since .        History of Present Illness   Chief complaint and concerns discussed with patient and confirmed as above.  having IC with  3 days ago, no pain with IC, bleeding started afterwards. Bright red, no clots, resolved the next day but still having some mild cramping, has used no OTC meds for cramps  Just wants to make sure IUD apppears in place. This IUD is 2 years old, she had Mirena prior to this also and never had bleeding. Normal recent pap      Health Status   Allergies:    Allergic Reactions (Selected)  No known allergies  No Known Medication Allergies   Medications:  (Selected)   Prescriptions  Prescribed  Ventolin HFA 90 mcg/inh inhalation aerosol: 2 puff(s), inh, qid, # 18 gm, 2 Refill(s), Type: Maintenance, Pharmacy: [facility] #[id number], 2 puff(s) inh qid  budesonide-formoterol 160 mcg-4.5 mcg/inh inhalation aerosol: 2 puff(s), inh, bid, # 1 EA, 11 Refill(s), Type: Maintenance, Pharmacy: [facility] #[id number], 2 puff(s) inh bid  loratadine 10 mg oral capsule: 1 cap(s) ( 10 mg ), po, daily, # 30 cap(s), 11 Refill(s), Type: Maintenance, Pharmacy: [facility] #[id number], 1 cap(s) po daily  Documented Medications  Documented  Mirena 52 mg intrauteral device: 1 EA ( 52 mg ), intrauteral, once, 0 Refill(s), Type: Maintenance   Problem list:    All Problems  Obesity / SNOMED CT 2580028240 / Probable  Asthma, mild intermittent / SNOMED CT 5696471849 / Confirmed  Seasonal allergies / SNOMED CT 321740953 / Confirmed  Resolved: Pregnancy / SNOMED CT 794357632  Canceled:  Asthma / SNOMED CT 387282730      Histories   Past Medical History:    Active  Obesity (6324332907)  Seasonal allergies (840780901)  Resolved  Pregnancy (303366017):  Resolved on 3/19/2011 at 20 years.   Family History:    Asthma  Daughter (Agnieszka)  Cancer - unknown origin  Mother (Selena, )  Comments:  2016 2:18 PM - Catalina Courtney  Leiomyosarcoma of uterus  Alcohol Dependence  Mother (Selena, )     Procedure history:    Single live birth (SNOMED CT 050480199) on 3/19/2011 at 20 Years.   Social History:        Alcohol Assessment            Current, 1-2 times per week, 1 drinks/episode average.  2 drinks/episode maximum.      Tobacco Assessment            Never      Substance Abuse Assessment            Never      Employment and Education Assessment            Employed, Work/School description: .      Home and Environment Assessment            Marital status: .  Spouse/Partner name: Aldo.  Risks in environment: owns secured gun.      Nutrition and Health Assessment            Type of diet: Regular.      Exercise and Physical Activity Assessment            Exercise frequency: Never.      Sexual Assessment            Sexually active: Yes.  Sexual orientation: Heterosexual.  Other contraceptive use: IUD.        Physical Examination   Vital Signs   1/10/2018 9:52 AM CST Temperature Tympanic 99.6 DegF    Peripheral Pulse Rate 92 bpm    Pulse Site Radial artery    HR Method Manual    Systolic Blood Pressure 142 mmHg  HI    Diastolic Blood Pressure 88 mmHg  HI    Mean Arterial Pressure 106 mmHg    BP Site Right arm    BP Method Manual      Measurements from flowsheet : Measurements   1/10/2018 9:52 AM CST Height Measured - Standard 63 in    Weight Measured - Standard 230.8 lb    BSA 2.15 m2    Body Mass Index 40.88 kg/m2  HI      General:  Alert and oriented.    Genitourinary:  No inguinal tenderness, No urethral discharge, No lesions, pelvic exam reveals no inguinal lymph nodes  palpable, no external lesions, no odor no discharge. Introitus normal with vault with normal rugae, cervix visualized and clear. No cervical motion tenderness, no adnexal tenderness or masses  IUD string visible, black approx 2 cm in length.       Impression and Plan   Diagnosis     IUD check up (MTZ50-XE Z30.431).     Patient Instructions:       Counseled: Patient, Regarding diagnosis, Regarding treatment, Regarding medications, Verbalized understanding.    Orders     reassured that IUD appears in place  if cramping doesn't resolve by end of weekend, or if bleeding returns, she is to call me and we will set up pelvic US.     she is reassured with this.

## 2022-02-16 NOTE — PROGRESS NOTES
Patient:   ADARSH DELATORRE            MRN: [id number]            FIN: [id number]               Age:   27 years     Sex:  Female     :  1990   Associated Diagnoses:   Well adult; Racing heart beat; Screening cholesterol level; Screening for diabetes mellitus (DM); Pap test, as part of routine gynecological examination; Asthma, mild intermittent   Author:   Alana Higgins      Visit Information      Date of Service: 2017 09:53 am  Performing Location: [facility]  Encounter#: [id number]      Primary Care Provider (PCP):  NONE ,       Referring Provider:  Alana Higgins    NPI# [id number]   Visit type:  Annual exam.    Accompanied by:  No one.    Source of history:  Self.    Referral source:  Self.    History limitation:  None.       Chief Complaint   2017 10:01 AM CDT   Px        Well Adult History   Well Adult History             The patient presents for well adult exam, here for annual, pap is due  Parenting 6 year old, , happy with New Mirena of just over 1 year, rare bleeding  , She occasionally feels racing heart, maybe 3 times per week, very short episode  Once it woke her at night  some family with A fib so worried about that  No thyroid problems in family    Doesnt think she has had diabetes or cholesterol screenn in past, fasting today.  The general health status is good.  The patient's diet is described as gaining wt, she and partner are starting a program together to loose wt and not balanced.  Exercise: none.  Associated symptoms consist of weight gain.  Last menstrual period: some spotting with IUD mirena.  Medical encounters:.        Review of Systems   Constitutional:  Negative except as documented in history of present illness.    Eye:  Negative except as documented in history of present illness.    Respiratory:  Negative except as documented in history of present illness.    Cardiovascular:  Negative except as documented in history of present illness.     Breast:  Negative.    Gastrointestinal:  Negative except as documented in history of present illness.    Genitourinary:  Negative except as documented in history of present illness.    Gynecologic:  Negative except as documented in history of present illness.    Hematology/Lymphatics:  Negative except as documented in history of present illness.    Endocrine:  Negative except as documented in history of present illness.    Immunologic:  Negative except as documented in history of present illness.    Musculoskeletal:  Negative except as documented in history of present illness.    Integumentary:  Negative except as documented in history of present illness.    Neurologic:  Negative except as documented in history of present illness.    Psychiatric:  Negative except as documented in history of present illness.              Health Status   Allergies:    Allergic Reactions (Selected)  No known allergies  No Known Medication Allergies   Medications:  (Selected)   Prescriptions  Prescribed  Ventolin HFA 90 mcg/inh inhalation aerosol: 2 puff(s), inh, qid, # 18 gm, 2 Refill(s), Type: Maintenance, Pharmacy: [facility] #[id number], 2 puff(s) inh qid  budesonide-formoterol 160 mcg-4.5 mcg/inh inhalation aerosol: 2 puff(s), inh, bid, # 1 EA, 11 Refill(s), Type: Maintenance, Pharmacy: [facility] #[id number], 2 puff(s) inh bid  loratadine 10 mg oral capsule: 1 cap(s) ( 10 mg ), po, daily, # 30 cap(s), 11 Refill(s), Type: Maintenance, Pharmacy: [facility] #[id number], 1 cap(s) po daily  Documented Medications  Documented  Mirena 52 mg intrauteral device: 1 EA ( 52 mg ), intrauteral, once, 0 Refill(s), Type: Maintenance   Problem list:    All Problems  Obesity / ICD-9-.00 / Probable  Asthma / SNOMED CT 513833110 / Confirmed  Seasonal allergies / SNOMED CT 403838859 / Confirmed  Resolved: Pregnancy / SNOMED CT 937421715      Histories   Past Medical History:    Active  Seasonal allergies (454165903)  Resolved  Pregnancy  (414102687):  Resolved on 3/19/2011 at 20 years.   Family History:    Mother: Selena  ()  Cause of Death: cancer  Age at Death: 34 years.   Alcohol Dependence  Cancer - unknown origin  Comments:  2016 2:18 PM - Sherwin WILLIAM, Catalina  Leiomyosarcoma of uterus  Father: Jomar      History is negative.  Sister: Ally      History is negative.  Brother: Nick      History is negative.  Daughter: Agnieszka    Asthma     Procedure history:    Single live birth (SNOMED CT 998516418) on 3/19/2011 at 20 Years.      Physical Examination   Vital Signs   2017 10:01 AM CDT Temperature Tympanic 99.4 DegF    Peripheral Pulse Rate 95 bpm    Pulse Site Radial artery    HR Method Electronic    Systolic Blood Pressure 130 mmHg    Diastolic Blood Pressure 88 mmHg    Mean Arterial Pressure 102 mmHg    BP Site Right arm    BP Method Manual      Measurements from flowsheet : Measurements   2017 10:01 AM CDT Height Measured - Standard 63 in    Weight Measured - Standard 223.4 lb    BSA 2.12 m2    Body Mass Index 39.57 kg/m2      General:  Alert and oriented, No acute distress, vital signs stable, as noted above.    Eye:  Pupils are equal, round and reactive to light, Extraocular movements are intact, Normal conjunctiva.    HENT:  Normocephalic, Tympanic membranes are clear, Normal hearing, Oral mucosa is moist, No pharyngeal erythema.    Neck:  Supple, Non-tender, No lymphadenopathy, No thyromegaly.    Respiratory:  Lungs are clear to auscultation, Respirations are non-labored, Breath sounds are equal, Symmetrical chest wall expansion.    Cardiovascular:  Normal rate, Regular rhythm, No murmur, No edema.    Breast:  No mass, No tenderness, No discharge, Breasts examined .  No infra nor supraclavicular nodes palpable.  No axillary nodes or masses palpable.  No nipple discharge. Breasts normal throughout.    Gastrointestinal:  Soft, Non-tender, Non-distended, No organomegaly.    Genitourinary:  Normal genitalia for age and sex,  No inguinal tenderness, No urethral discharge, No lesions, IUD string visible.         Perineum: Within normal limits.         Groin/ inguinal region: Within normal limits.         Urethra: Within normal limits.         Vagina: Within normal limits.         Labia: Within normal limits.         Cervix: Within normal limits.         Uterus: Within normal limits.         Adnexa: Within normal limits.    Lymphatics:  no axillary, no supra or infraclavicular nor inguinal lymphadenopathy palpable.    Musculoskeletal:  Normal range of motion, Normal strength, No deformity, Normal gait.    Integumentary:  Warm, Dry, Pink, Intact, No rash.    Neurologic:  Alert, Oriented, Normal sensory, Normal motor function, Cranial Nerves II-XII are grossly intact.    Psychiatric:  Cooperative, Appropriate mood & affect, Normal judgment, PHQ 9/CAGE questionaire reviewed and discussed with patient,  see score.       Review / Management   Results review      Impression and Plan   Diagnosis     Well adult (SIE95-GN Z00.00).     Racing heart beat (BRK35-YL R00.0).     Asthma, mild intermittent (SQA49-DT J45.20).     Screening cholesterol level (ZWA04-VQ Z13.220).     Screening for diabetes mellitus (DM) (OTF31-HM Z13.1).     Pap test, as part of routine gynecological examination (IYH21-JJ Z01.419).     Patient Instructions:       Counseled: Patient, Regarding diagnosis, Regarding medications, Verbalized understanding, counseled on health benefits of healthy weight, regular exercise, healthy diet.    Orders     Orders (Selected)   Outpatient Orders  Ordered (Dispatched)  ThinPrep Imaging Pap reflex HPV mRNA E6/E7* ([facility]): Specimen Type: Pap, Collection Date: 09/20/17 10:33:00 CDT  Ordered (In Transit)  Hemoglobin A1c* ([facility]): Specimen Type: Blood, Collection Date: 09/20/17 10:30:00 CDT  Lipid panel with reflex to direct ldl* ([facility]): Specimen Type: Serum, Collection Date: 09/20/17 10:30:00 CDT  TSH* ([facility]): Specimen Type: Serum, Collection  Date: 09/20/17 10:30:00 CDT.         controlled asthma  no inhaler use 1 year  ACT and AAP reviewed.

## 2022-02-16 NOTE — TELEPHONE ENCOUNTER
---------------------  From: Jenifer Schmitt   To: ADARSH DELATORRE    Sent: 5/24/2021 10:40:25 AM CDT  Subject: General Message     Alana Woody wanted us to reach out to you to see if you would like to schedule for a Covid vaccine.  If you are interested please message back or call and speak to anyone in scheduling.  Our phone number is [phone].    Thank you,     Jenifer -Patient Services

## 2022-02-16 NOTE — TELEPHONE ENCOUNTER
---------------------  From: Alana Higgins   To: Appointment Pool ([id number]);     Sent: 5/14/2021 2:45:34 PM CDT  Subject: General Message     please call her Monday and offer her an appt for COVID vaccine #1. She agreed today when we talked but I think she left the clinic before an appt was made, thanks.Left message for patient to call us to schedule appointment for covid vaccineN/A 5-17-21LM on patient's portal.

## 2022-02-16 NOTE — NURSING NOTE
Depression Screening Entered On:  5/14/2021 3:15 PM CDT    Performed On:  5/14/2021 3:15 PM CDT by Sindy Villegas LPN               Depression Screening   Little Interest - Pleasure in Activities :   Not at all   Feeling Down, Depressed, Hopeless :   Not at all   Initial Depression Screen Score :   0 Score   Poor Appetite or Overeating :   Not at all   Trouble Falling or Staying Asleep :   Not at all   Feeling Tired or Little Energy :   Not at all   Feeling Bad About Yourself :   Not at all   Trouble Concentrating :   Not at all   Moving or Speaking Slowly :   Not at all   Thoughts Better Off Dead or Hurting Self :   Not at all   Detailed Depression Screen Score :   0    Total Depression Screen Score :   0    Sindy Villegas LPN - 5/14/2021 3:15 PM CDT

## 2022-02-16 NOTE — NURSING NOTE
Depression Screening Entered On:  11/21/2019 4:59 PM CST    Performed On:  11/21/2019 4:58 PM CST by Sindy Villegas LPN               Depression Screening   Little Interest - Pleasure in Activities :   Not at all   Feeling Down, Depressed, Hopeless :   Not at all   Initial Depression Screen Score :   0    Trouble Falling or Staying Asleep :   Several days   Feeling Tired or Little Energy :   Not at all   Poor Appetite or Overeating :   Not at all   Feeling Bad About Yourself :   Not at all   Trouble Concentrating :   Not at all   Moving or Speaking Slowly :   Not at all   Thoughts Better Off Dead or Hurting Self :   Not at all   Detailed Depression Screen Score :   1    Total Depression Screen Score :   1    JORGE Difficulty with Work, Home, Others :   Not difficult at all   Sindy Villegas LPN - 11/21/2019 4:58 PM CST

## 2022-02-16 NOTE — NURSING NOTE
Asthma Assessment Entered On:  11/21/2019 4:30 PM CST    Performed On:  11/21/2019 4:29 PM CST by Alana Higgins   Asthma Severity :   Moderate Persistent   Alana Higgins - 11/21/2019 4:29 PM CST   Asthma Precipitating Factors Grid   Upper Respiratory Infection :   Yes   Weather Changes :   Yes   Alana Higgins - 11/21/2019 4:29 PM CST

## 2022-02-16 NOTE — NURSING NOTE
Asthma Assessment Entered On:  5/14/2021 2:53 PM CDT    Performed On:  5/14/2021 2:53 PM CDT by Alana Higgins   Asthma Severity :   Mild Persistent   Alana Higgins - 5/14/2021 2:53 PM CDT   Asthma Precipitating Factors Grid   Weather Changes :   Yes   Alana Higgins - 5/14/2021 2:53 PM CDT

## 2022-02-16 NOTE — TELEPHONE ENCOUNTER
---------------------  From: Alana Higgins   To: [facility] Message Pool ([id number]_[facility]); ADARSH DELATORRE    Sent: 5/17/2021 5:07:27 PM CDT  Subject: General Message     Your pap smear is normal and the screening test for high risk strains of human papilloma virus is negative.  These tests should be repeated in 5 years.  You should return in one year for your annual physical , but a pap smear will not be necessary at that time.    DOUG [facility] order placed.

## 2022-09-01 ENCOUNTER — OFFICE VISIT (OUTPATIENT)
Dept: FAMILY MEDICINE | Facility: CLINIC | Age: 32
End: 2022-09-01
Payer: COMMERCIAL

## 2022-09-01 VITALS
BODY MASS INDEX: 37.39 KG/M2 | HEART RATE: 95 BPM | SYSTOLIC BLOOD PRESSURE: 124 MMHG | HEIGHT: 63 IN | DIASTOLIC BLOOD PRESSURE: 86 MMHG | WEIGHT: 211 LBS

## 2022-09-01 DIAGNOSIS — E66.01 SEVERE OBESITY (H): ICD-10-CM

## 2022-09-01 DIAGNOSIS — Z13.1 SCREENING FOR DIABETES MELLITUS: ICD-10-CM

## 2022-09-01 DIAGNOSIS — Z11.4 SCREENING FOR HIV (HUMAN IMMUNODEFICIENCY VIRUS): ICD-10-CM

## 2022-09-01 DIAGNOSIS — Z00.00 ANNUAL PHYSICAL EXAM: ICD-10-CM

## 2022-09-01 DIAGNOSIS — Z11.59 NEED FOR HEPATITIS C SCREENING TEST: ICD-10-CM

## 2022-09-01 DIAGNOSIS — Z13.220 LIPID SCREENING: Primary | ICD-10-CM

## 2022-09-01 DIAGNOSIS — J45.30 MILD PERSISTENT ASTHMA WITHOUT COMPLICATION: ICD-10-CM

## 2022-09-01 PROBLEM — J30.2 SEASONAL ALLERGIC RHINITIS: Status: ACTIVE | Noted: 2022-09-01

## 2022-09-01 LAB
CHOLEST SERPL-MCNC: 208 MG/DL
FASTING STATUS PATIENT QL REPORTED: YES
GLUCOSE SERPL-MCNC: 91 MG/DL (ref 70–99)
HDLC SERPL-MCNC: 66 MG/DL
HIV 1+2 AB+HIV1 P24 AG SERPL QL IA: NONREACTIVE
LDLC SERPL CALC-MCNC: 131 MG/DL
NONHDLC SERPL-MCNC: 142 MG/DL
TRIGL SERPL-MCNC: 56 MG/DL

## 2022-09-01 PROCEDURE — 80061 LIPID PANEL: CPT | Performed by: NURSE PRACTITIONER

## 2022-09-01 PROCEDURE — 99395 PREV VISIT EST AGE 18-39: CPT | Performed by: NURSE PRACTITIONER

## 2022-09-01 PROCEDURE — 36415 COLL VENOUS BLD VENIPUNCTURE: CPT | Performed by: NURSE PRACTITIONER

## 2022-09-01 PROCEDURE — 87389 HIV-1 AG W/HIV-1&-2 AB AG IA: CPT | Performed by: NURSE PRACTITIONER

## 2022-09-01 PROCEDURE — 86803 HEPATITIS C AB TEST: CPT | Performed by: NURSE PRACTITIONER

## 2022-09-01 PROCEDURE — 99213 OFFICE O/P EST LOW 20 MIN: CPT | Mod: 25 | Performed by: NURSE PRACTITIONER

## 2022-09-01 PROCEDURE — 82947 ASSAY GLUCOSE BLOOD QUANT: CPT | Mod: QW | Performed by: NURSE PRACTITIONER

## 2022-09-01 PROCEDURE — 87902 NFCT AGT GNTYP ALYS HEP C: CPT | Performed by: NURSE PRACTITIONER

## 2022-09-01 RX ORDER — ALBUTEROL SULFATE 90 UG/1
2 AEROSOL, METERED RESPIRATORY (INHALATION) EVERY 4 HOURS PRN
Qty: 18 G | Refills: 6 | Status: SHIPPED | OUTPATIENT
Start: 2022-09-01 | End: 2023-09-12

## 2022-09-01 RX ORDER — BUDESONIDE AND FORMOTEROL FUMARATE DIHYDRATE 160; 4.5 UG/1; UG/1
2 AEROSOL RESPIRATORY (INHALATION) 2 TIMES DAILY
Qty: 10 G | Refills: 11 | Status: SHIPPED | OUTPATIENT
Start: 2022-09-01 | End: 2023-09-12

## 2022-09-01 RX ORDER — LORATADINE 10 MG/1
10 CAPSULE, LIQUID FILLED ORAL DAILY
COMMUNITY
Start: 2021-05-14 | End: 2023-08-15

## 2022-09-01 ASSESSMENT — ENCOUNTER SYMPTOMS
HEMATURIA: 0
CONSTIPATION: 0
ABDOMINAL PAIN: 0
DIZZINESS: 0
HEADACHES: 0
PALPITATIONS: 0
MYALGIAS: 0
SHORTNESS OF BREATH: 0
FEVER: 0
SORE THROAT: 0
HEMATOCHEZIA: 0
PARESTHESIAS: 0
FREQUENCY: 0
DIARRHEA: 0
EYE PAIN: 0
ARTHRALGIAS: 0
WEAKNESS: 0
JOINT SWELLING: 0
CHILLS: 0
HEARTBURN: 0
DYSURIA: 0
NERVOUS/ANXIOUS: 0
NAUSEA: 0
COUGH: 0

## 2022-09-01 ASSESSMENT — ASTHMA QUESTIONNAIRES
ACT_TOTALSCORE: 22
QUESTION_3 LAST FOUR WEEKS HOW OFTEN DID YOUR ASTHMA SYMPTOMS (WHEEZING, COUGHING, SHORTNESS OF BREATH, CHEST TIGHTNESS OR PAIN) WAKE YOU UP AT NIGHT OR EARLIER THAN USUAL IN THE MORNING: NOT AT ALL
QUESTION_5 LAST FOUR WEEKS HOW WOULD YOU RATE YOUR ASTHMA CONTROL: WELL CONTROLLED
ACT_TOTALSCORE: 22
QUESTION_4 LAST FOUR WEEKS HOW OFTEN HAVE YOU USED YOUR RESCUE INHALER OR NEBULIZER MEDICATION (SUCH AS ALBUTEROL): ONCE A WEEK OR LESS
QUESTION_4 LAST FOUR WEEKS HOW OFTEN HAVE YOU USED YOUR RESCUE INHALER OR NEBULIZER MEDICATION (SUCH AS ALBUTEROL): ONCE A WEEK OR LESS
ACT_TOTALSCORE: 23
QUESTION_5 LAST FOUR WEEKS HOW WOULD YOU RATE YOUR ASTHMA CONTROL: WELL CONTROLLED
QUESTION_3 LAST FOUR WEEKS HOW OFTEN DID YOUR ASTHMA SYMPTOMS (WHEEZING, COUGHING, SHORTNESS OF BREATH, CHEST TIGHTNESS OR PAIN) WAKE YOU UP AT NIGHT OR EARLIER THAN USUAL IN THE MORNING: NOT AT ALL
QUESTION_2 LAST FOUR WEEKS HOW OFTEN HAVE YOU HAD SHORTNESS OF BREATH: ONCE OR TWICE A WEEK
QUESTION_1 LAST FOUR WEEKS HOW MUCH OF THE TIME DID YOUR ASTHMA KEEP YOU FROM GETTING AS MUCH DONE AT WORK, SCHOOL OR AT HOME: NONE OF THE TIME
QUESTION_2 LAST FOUR WEEKS HOW OFTEN HAVE YOU HAD SHORTNESS OF BREATH: NOT AT ALL
QUESTION_1 LAST FOUR WEEKS HOW MUCH OF THE TIME DID YOUR ASTHMA KEEP YOU FROM GETTING AS MUCH DONE AT WORK, SCHOOL OR AT HOME: NONE OF THE TIME

## 2022-09-01 NOTE — LETTER
My Asthma Action Plan    Name: Lenora Simental   YOB: 1990  Date: 9/1/2022   My doctor: Alana Singh NP   My clinic: [facility]        My Control Medicine: Budesonide + formoterol (Symbicort HFA) -  160/4.5 mcg 2 puffs twice a day  My Rescue Medicine: Albuterol (Proair/Ventolin/Proventil HFA) 2-4 puffs EVERY 4 HOURS as needed. Use a spacer if recommended by your provider.   My Asthma Severity:   Moderate Persistent  Know your asthma triggers: upper respiratory infections and cold air               GREEN ZONE   Good Control    I feel good    No cough or wheeze    Can work, sleep and play without asthma symptoms       Take your asthma control medicine every day.     1. If exercise triggers your asthma, take your rescue medication    15 minutes before exercise or sports, and    During exercise if you have asthma symptoms  2. Spacer to use with inhaler: If you have a spacer, make sure to use it with your inhaler             YELLOW ZONE Getting Worse  I have ANY of these:    I do not feel good    Cough or wheeze    Chest feels tight    Wake up at night   1. Keep taking your Green Zone medications  2. Start taking your rescue medicine:    every 20 minutes for up to 1 hour. Then every 4 hours for 24-48 hours.  3. If you stay in the Yellow Zone for more than 12-24 hours, contact your doctor.  4. If you do not return to the Green Zone in 12-24 hours or you get worse, start taking your oral steroid medicine if prescribed by your provider.           RED ZONE Medical Alert - Get Help  I have ANY of these:    I feel awful    Medicine is not helping    Breathing getting harder    Trouble walking or talking    Nose opens wide to breathe       1. Take your rescue medicine NOW  2. If your provider has prescribed an oral steroid medicine, start taking it NOW  3. Call your doctor NOW  4. If you are still in the Red Zone after 20 minutes and you have not reached your doctor:    Take your  rescue medicine again and    Call 911 or go to the emergency room right away    See your regular doctor within 2 weeks of an Emergency Room or Urgent Care visit for follow-up treatment.          Annual Reminders:  Meet with Asthma Educator,  Flu Shot in the Fall, consider Pneumonia Vaccination for patients with asthma (aged 19 and older).    [id number] [address] AT [facility] [address]    Electronically signed by Alana Singh NP   Date: 09/01/22                      Asthma Triggers  How To Control Things That Make Your Asthma Worse    Triggers are things that make your asthma worse.  Look at the list below to help you find your triggers and what you can do about them.  You can help prevent asthma flare-ups by staying away from your triggers.      Trigger                                                          What you can do   Cigarette Smoke  Tobacco smoke can make asthma worse. Do not allow smoking in your home, car or around you.  Be sure no one smokes at a child s day care or school.  If you smoke, ask your health care provider for ways to help you quit.  Ask family members to quit too.  Ask your health care provider for a referral to Quit Plan to help you quit smoking, or call 0-734-028-PLAN.     Colds, Flu, Bronchitis  These are common triggers of asthma. Wash your hands often.  Don t touch your eyes, nose or mouth.  Get a flu shot every year.     Dust Mites  These are tiny bugs that live in cloth or carpet. They are too small to see. Wash sheets and blankets in hot water every week.   Encase pillows and mattress in dust mite proof covers.  Avoid having carpet if you can. If you have carpet, vacuum weekly.   Use a dust mask and HEPA vacuum.   Pollen and Outdoor Mold  Some people are allergic to trees, grass, or weed pollen, or molds. Try to keep your windows closed.  Limit time out doors when pollen count is high.   Ask you health care provider about taking  medicine during allergy season.     Animal Dander  Some people are allergic to skin flakes, urine or saliva from pets with fur or feathers. Keep pets with fur or feathers out of your home.    If you can t keep the pet outdoors, then keep the pet out of your bedroom.  Keep the bedroom door closed.  Keep pets off cloth furniture and away from stuffed toys.     Mice, Rats, and Cockroaches   Some people are allergic to the waste from these pests.   Cover food and garbage.  Clean up spills and food crumbs.  Store grease in the refrigerator.   Keep food out of the bedroom.   Indoor Mold  This can be a trigger if your home has high moisture. Fix leaking faucets, pipes, or other sources of water.   Clean moldy surfaces.  Dehumidify basement if it is damp and smelly.   Smoke, Strong Odors, and Sprays  These can reduce air quality. Stay away from strong odors and sprays, such as perfume, powder, hair spray, paints, smoke incense, paint, cleaning products, candles and new carpet.   Exercise or Sports  Some people with asthma have this trigger. Be active!  Ask your doctor about taking medicine before sports or exercise to prevent symptoms.    Warm up for 5-10 minutes before and after sports or exercise.     Other Triggers of Asthma  Cold air:  Cover your nose and mouth with a scarf.  Sometimes laughing or crying can be a trigger.  Some medicines and food can trigger asthma.

## 2022-09-01 NOTE — PROGRESS NOTES
SUBJECTIVE:   CC: Lenora Simental is an 32 year old woman who presents for preventive health visit.       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  No menses  Pap is up-to-date they have always been normal can defer  No family history of breast cancer.  She does have some recurrent pimples under the breasts for the years past years would like me to check them.  The drain and bruise sometimes are painful.  She never gets them in the armpits or in the groin    Has asthma that is well controlled.  The only time she has been in the emergency room was when her OB GYN had her stop her daily steroid because of pregnancy.  She has needed to be her Symbicort twice a day or she does flare.  Her ACT is 22 today and she is in very good control.  See asthma action plan she needs refills.    She has struggled with weight over the years.  She recently downloaded [employer] tessa and has started losing weight again.  She is fasting today and will get screening lipids and glucose.  Counseled on the importance of weight loss to prevent diabetes and cardiovascular complications later in life  Healthy Habits:     Getting at least 3 servings of Calcium per day:  Yes    Bi-annual eye exam:  Yes    Dental care twice a year:  Yes    Sleep apnea or symptoms of sleep apnea:  None    Diet:  Regular (no restrictions)    Frequency of exercise:  2-3 days/week    Duration of exercise:  15-30 minutes    Taking medications regularly:  Yes    Medication side effects:  Not applicable    PHQ-2 Total Score: 0    Additional concerns today:  No              Today's PHQ-2 Score:   PHQ-2 ( 1999 Pfizer) 9/1/2022   Q1: Little interest or pleasure in doing things 0   Q2: Feeling down, depressed or hopeless 0   PHQ-2 Score 0   Q1: Little interest or pleasure in doing things Not at all   Q2: Feeling down, depressed or hopeless Not at all   PHQ-2 Score 0        Abuse: Current or Past (Physical, Sexual or Emotional) - No  Do you feel safe in your environment? Yes    Have you ever done Advance Care Planning? (For example, a Health Directive, POLST, or a discussion with a medical provider or your loved ones about your wishes): No, advance care planning information given to patient to review.  Patient plans to discuss their wishes with loved ones or provider.      Social History     Tobacco Use     Smoking status: Never Smoker     Smokeless tobacco: Not on file     Tobacco comment: No second hand smoke at home 3/7/05   Substance Use Topics     Alcohol use: No         Alcohol Use 9/1/2022   Prescreen: >3 drinks/day or >7 drinks/week? No       Reviewed orders with patient.  Reviewed health maintenance and updated orders accordingly - Yes  Lab work is in process    Breast Cancer Screening:  Any new diagnosis of family breast, ovarian, or bowel cancer? No    FHS-7: No flowsheet data found.    Mammogram Screening - Offered annual screening and updated Health Maintenance for mutual plan based on risk factor consideration    Pertinent mammograms are reviewed under the imaging tab.    History of abnormal Pap smear: NO - age 30-65 PAP every 5 years with negative HPV co-testing recommended  PAP / HPV 8/17/2010   PAP (Historical) NIL     Reviewed and updated as needed this visit by clinical staff    Allergies                 Reviewed and updated as needed this visit by Provider                       Review of Systems   Constitutional: Negative for chills and fever.   HENT: Negative for congestion, ear pain, hearing loss and sore throat.    Eyes: Negative for pain and visual disturbance.   Respiratory: Negative for cough and shortness of breath.    Cardiovascular: Negative for chest pain, palpitations and peripheral edema.   Gastrointestinal: Negative for abdominal pain, constipation, diarrhea, heartburn, hematochezia and nausea.   Genitourinary: Negative for dysuria, frequency,  genital sores, hematuria, pelvic pain, urgency and vaginal bleeding.   Musculoskeletal: Negative for arthralgias, joint swelling and myalgias.   Skin: Negative for rash.   Neurological: Negative for dizziness, weakness, headaches and paresthesias.   Psychiatric/Behavioral: Negative for mood changes. The patient is not nervous/anxious.           OBJECTIVE:   There were no vitals taken for this visit.  Physical Exam  GENERAL: healthy, alert and no distress  EYES: Eyes grossly normal to inspection, PERRL and conjunctivae and sclerae normal  HENT: normal cephalic/atraumatic, ear canals and TM's normal and oral mucous membranes moist  NECK: no adenopathy, no asymmetry, masses, or scars and thyroid normal to palpation  RESP: lungs clear to auscultation - no rales, rhonchi or wheezes  CV: regular rate and rhythm, normal S1 S2, no S3 or S4, no murmur, click or rub, no peripheral edema and peripheral pulses strong  ABDOMEN: soft, nontender, no hepatosplenomegaly, no masses and bowel sounds normal  MS: no gross musculoskeletal defects noted, no edema        ASSESSMENT/PLAN:       ICD-10-CM    1. Lipid screening  Z13.220 Lipid panel reflex to direct LDL Fasting   2. Screening for HIV (human immunodeficiency virus)  Z11.4 HIV Antigen Antibody Combo   3. Need for hepatitis C screening test  Z11.59 Hepatitis C Screen Reflex to HCV RNA Quant and Genotype   4. Mild persistent asthma without complication  J45.30 albuterol (VENTOLIN HFA) 108 (90 Base) MCG/ACT inhaler     budesonide-formoterol (SYMBICORT) 160-4.5 MCG/ACT Inhaler   5. Screening for diabetes mellitus  Z13.1 Glucose   6. Annual physical exam  Z00.00    7. Severe obesity (H)  E66.01      Use phisoderm and hair dryer on skin under breasts breasts  Patient has been advised of split billing requirements and indicates understanding: Yes    COUNSELING:  Reviewed preventive health counseling, as reflected in patient instructions       Regular exercise       Healthy  "diet/nutrition       Vision screening       Family planning       Consider Hep C screening for all patients one time for ages 18-79 years       HIV screeninx in teen years, 1x in adult years, and at intervals if high risk       Advance Care Planning    Estimated body mass index is 41.07 kg/m  as calculated from the following:    Height as of 21: 1.594 m (5' 2.75\").    Weight as of 21: 104.3 kg (230 lb).    Weight management plan: Discussed healthy diet and exercise guidelines    She reports that she has never smoked. She does not have any smokeless tobacco history on file.      Counseling Resources:  ATP IV Guidelines  Pooled Cohorts Equation Calculator  Breast Cancer Risk Calculator  BRCA-Related Cancer Risk Assessment: FHS-7 Tool  FRAX Risk Assessment  ICSI Preventive Guidelines  Dietary Guidelines for Americans, 2010  USDA's MyPlate  ASA Prophylaxis  Lung CA Screening    Alana Singh NP  [facility]  "

## 2022-09-02 LAB — HCV AB SERPL QL IA: REACTIVE

## 2022-09-05 LAB — HCV RNA SERPL NAA+PROBE-ACNC: NOT DETECTED IU/ML

## 2022-10-03 ENCOUNTER — HEALTH MAINTENANCE LETTER (OUTPATIENT)
Age: 32
End: 2022-10-03

## 2023-08-02 ENCOUNTER — PATIENT OUTREACH (OUTPATIENT)
Dept: CARE COORDINATION | Facility: CLINIC | Age: 33
End: 2023-08-02
Payer: COMMERCIAL

## 2023-08-15 ENCOUNTER — OFFICE VISIT (OUTPATIENT)
Dept: FAMILY MEDICINE | Facility: CLINIC | Age: 33
End: 2023-08-15
Payer: COMMERCIAL

## 2023-08-15 VITALS
HEART RATE: 84 BPM | HEIGHT: 63 IN | SYSTOLIC BLOOD PRESSURE: 126 MMHG | DIASTOLIC BLOOD PRESSURE: 80 MMHG | TEMPERATURE: 98.7 F | WEIGHT: 191.3 LBS | BODY MASS INDEX: 33.89 KG/M2 | OXYGEN SATURATION: 97 %

## 2023-08-15 DIAGNOSIS — L73.9 FOLLICULITIS: Primary | ICD-10-CM

## 2023-08-15 PROCEDURE — 99213 OFFICE O/P EST LOW 20 MIN: CPT | Performed by: NURSE PRACTITIONER

## 2023-08-15 RX ORDER — CEPHALEXIN 500 MG/1
500 CAPSULE ORAL 3 TIMES DAILY
Qty: 21 CAPSULE | Refills: 0 | Status: SHIPPED | OUTPATIENT
Start: 2023-08-15 | End: 2023-09-12

## 2023-08-15 ASSESSMENT — ASTHMA QUESTIONNAIRES: ACT_TOTALSCORE: 23

## 2023-08-15 NOTE — PROGRESS NOTES
"  Assessment & Plan     (L73.9) Folliculitis  (primary encounter diagnosis)  Comment: warm pack or warm shower, antibiotic, rtc if not resolved  Plan: cephALEXin (KEFLEX) 500 MG capsule                     BMI:   Estimated body mass index is 33.89 kg/m  as calculated from the following:    Height as of this encounter: 1.6 m (5' 3\").    Weight as of this encounter: 86.8 kg (191 lb 4.8 oz).           Alana Singh NP  [facility]    Kayleigh Cooley is a 33 year old, presenting for the following health issues: lump on center of lower back,  first noticed about 6 months ago, was just a small bump under the skin, over the past 2 weeks it has flared up and is draining, not painful at this time but was. Has never had this or a sebaceous cyst in past. She does struggle with acne. Denies bug bit. Area was a little itchy.  Mass        8/15/2023    12:54 PM   Additional Questions   Roomed by juan avila   Accompanied by self       History of Present Illness       Back Pain:  She presents for follow up of back pain. Patient's back pain is a new problem.    Original cause of back pain: other  First noticed back pain: more than 1 month ago  Patient feels back pain: constantlyLocation of back pain:  Right lower back  Description of back pain: other  Back pain spreads: nowhere    Since patient first noticed back pain, pain is: unchanged  Does back pain interfere with her job:  No  On a scale of 1-10 (10 being the worst), patient describes pain as:  1  What makes back pain worse: bending and certain positions   Acupuncture: not tried  Acetaminophen: not tried  Activity or exercise: not helpful  Chiropractor:  Not tried  Cold: helpful  Heat: helpful  Massage: not tried  Muscle relaxants: not tried  NSAIDS: helpful  Opioids: not tried  Physical Therapy: not tried  Rest: helpful  Steroid Injection: not tried  Stretching: not helpful  Surgery: not tried  TENS unit: not tried  Topical pain relievers: " "helpful  Other healthcare providers patient is seeing for back pain: None    She eats 2-3 servings of fruits and vegetables daily.She consumes 0 sweetened beverage(s) daily.She exercises with enough effort to increase her heart rate 30 to 60 minutes per day.  She exercises with enough effort to increase her heart rate 4 days per week.   She is taking medications regularly.         9/1/2022     7:56 AM 9/1/2022     8:06 AM 8/15/2023    12:42 PM   ACT Total Scores   ACT TOTAL SCORE (Goal Greater than or Equal to 20) 23 22 23   In the past 12 months, how many times did you visit the emergency room for your asthma without being admitted to the hospital? 0 0 0   In the past 12 months, how many times were you hospitalized overnight because of your asthma? 0 0 0            Review of Systems         Objective    /80 (BP Location: Right arm, Patient Position: Sitting)   Pulse 84   Temp 98.7  F (37.1  C)   Ht 1.6 m (5' 3\")   Wt 86.8 kg (191 lb 4.8 oz)   LMP  (LMP Unknown)   SpO2 97%   BMI 33.89 kg/m    Body mass index is 33.89 kg/m .  Physical Exam   GENERAL: healthy, alert and no distress  Has scabbed but inflamed area 1.5 by 1 cm palpable firmness under lesion inflamed low mid back                      "

## 2023-09-12 ENCOUNTER — OFFICE VISIT (OUTPATIENT)
Dept: FAMILY MEDICINE | Facility: CLINIC | Age: 33
End: 2023-09-12
Payer: COMMERCIAL

## 2023-09-12 ENCOUNTER — TELEPHONE (OUTPATIENT)
Dept: FAMILY MEDICINE | Facility: CLINIC | Age: 33
End: 2023-09-12

## 2023-09-12 VITALS
BODY MASS INDEX: 34.04 KG/M2 | OXYGEN SATURATION: 98 % | TEMPERATURE: 98.1 F | HEIGHT: 63 IN | HEART RATE: 84 BPM | SYSTOLIC BLOOD PRESSURE: 124 MMHG | WEIGHT: 192.1 LBS | DIASTOLIC BLOOD PRESSURE: 76 MMHG

## 2023-09-12 DIAGNOSIS — N60.01 BILATERAL BREAST CYSTS: ICD-10-CM

## 2023-09-12 DIAGNOSIS — Z00.00 ROUTINE GENERAL MEDICAL EXAMINATION AT A HEALTH CARE FACILITY: Primary | ICD-10-CM

## 2023-09-12 DIAGNOSIS — E66.811 CLASS 1 OBESITY WITHOUT SERIOUS COMORBIDITY WITH BODY MASS INDEX (BMI) OF 33.0 TO 33.9 IN ADULT, UNSPECIFIED OBESITY TYPE: ICD-10-CM

## 2023-09-12 DIAGNOSIS — N60.02 BILATERAL BREAST CYSTS: ICD-10-CM

## 2023-09-12 DIAGNOSIS — J45.30 MILD PERSISTENT ASTHMA WITHOUT COMPLICATION: Primary | ICD-10-CM

## 2023-09-12 DIAGNOSIS — J45.30 MILD PERSISTENT ASTHMA WITHOUT COMPLICATION: ICD-10-CM

## 2023-09-12 PROBLEM — E66.01 SEVERE OBESITY (H): Status: RESOLVED | Noted: 2022-09-01 | Resolved: 2023-09-12

## 2023-09-12 PROCEDURE — 99395 PREV VISIT EST AGE 18-39: CPT | Performed by: NURSE PRACTITIONER

## 2023-09-12 PROCEDURE — 99213 OFFICE O/P EST LOW 20 MIN: CPT | Mod: 25 | Performed by: NURSE PRACTITIONER

## 2023-09-12 RX ORDER — BUDESONIDE AND FORMOTEROL FUMARATE DIHYDRATE 160; 4.5 UG/1; UG/1
2 AEROSOL RESPIRATORY (INHALATION) 2 TIMES DAILY
Qty: 10.2 G | Refills: 11 | Status: SHIPPED | OUTPATIENT
Start: 2023-09-12 | End: 2024-01-08

## 2023-09-12 RX ORDER — ALBUTEROL SULFATE 90 UG/1
2 AEROSOL, METERED RESPIRATORY (INHALATION) EVERY 4 HOURS PRN
Qty: 18 G | Refills: 11 | Status: SHIPPED | OUTPATIENT
Start: 2023-09-12

## 2023-09-12 ASSESSMENT — ENCOUNTER SYMPTOMS
SORE THROAT: 0
NAUSEA: 0
NERVOUS/ANXIOUS: 0
ABDOMINAL PAIN: 0
WEAKNESS: 0
JOINT SWELLING: 0
PARESTHESIAS: 0
CONSTIPATION: 0
ARTHRALGIAS: 0
FEVER: 0
DIARRHEA: 0
HEARTBURN: 0
HEMATOCHEZIA: 0
HEMATURIA: 0
COUGH: 0
MYALGIAS: 0
DIZZINESS: 0
SHORTNESS OF BREATH: 0
HEADACHES: 0
EYE PAIN: 0
CHILLS: 0
BREAST MASS: 0
PALPITATIONS: 0
DYSURIA: 0
FREQUENCY: 0

## 2023-09-12 NOTE — LETTER
My Asthma Action Plan    Name: Lenora Simental   YOB: 1990  Date: 9/12/2023   My doctor: Alana Singh NP   My clinic: [facility]        My Control Medicine: Budesonide + formoterol (Symbicort HFA) -  160/4.5 mcg 2 puffs bid  My Rescue Medicine: Albuterol (Proair/Ventolin/Proventil HFA) 2-4 puffs EVERY 4 HOURS as needed. Use a spacer if recommended by your provider.   My Asthma Severity:   Severe Persistent  Know your asthma triggers: upper respiratory infections and pollens               GREEN ZONE   Good Control  I feel good  No cough or wheeze  Can work, sleep and play without asthma symptoms       Take your asthma control medicine every day.     If exercise triggers your asthma, take your rescue medication  15 minutes before exercise or sports, and  During exercise if you have asthma symptoms  Spacer to use with inhaler: If you have a spacer, make sure to use it with your inhaler             YELLOW ZONE Getting Worse  I have ANY of these:  I do not feel good  Cough or wheeze  Chest feels tight  Wake up at night   Keep taking your Green Zone medications  Start taking your rescue medicine:  every 20 minutes for up to 1 hour. Then every 4 hours for 24-48 hours.  If you stay in the Yellow Zone for more than 12-24 hours, contact your doctor.  If you do not return to the Green Zone in 12-24 hours or you get worse, start taking your oral steroid medicine if prescribed by your provider.           RED ZONE Medical Alert - Get Help  I have ANY of these:  I feel awful  Medicine is not helping  Breathing getting harder  Trouble walking or talking  Nose opens wide to breathe       Take your rescue medicine NOW  If your provider has prescribed an oral steroid medicine, start taking it NOW  Call your doctor NOW  If you are still in the Red Zone after 20 minutes and you have not reached your doctor:  Take your rescue medicine again and  Call 911 or go to the emergency room right  away    See your regular doctor within 2 weeks of an Emergency Room or Urgent Care visit for follow-up treatment.          Annual Reminders:  Meet with Asthma Educator,  Flu Shot in the Fall, consider Pneumonia Vaccination for patients with asthma (aged 19 and older).    [id number] [address] AT [address] [facility]    Electronically signed by Alana Singh NP   Date: 09/12/23                      Asthma Triggers  How To Control Things That Make Your Asthma Worse    Triggers are things that make your asthma worse.  Look at the list below to help you find your triggers and what you can do about them.  You can help prevent asthma flare-ups by staying away from your triggers.      Trigger                                                          What you can do   Cigarette Smoke  Tobacco smoke can make asthma worse. Do not allow smoking in your home, car or around you.  Be sure no one smokes at a child s day care or school.  If you smoke, ask your health care provider for ways to help you quit.  Ask family members to quit too.  Ask your health care provider for a referral to Quit Plan to help you quit smoking, or call 7-651-306-PLAN.     Colds, Flu, Bronchitis  These are common triggers of asthma. Wash your hands often.  Don t touch your eyes, nose or mouth.  Get a flu shot every year.     Dust Mites  These are tiny bugs that live in cloth or carpet. They are too small to see. Wash sheets and blankets in hot water every week.   Encase pillows and mattress in dust mite proof covers.  Avoid having carpet if you can. If you have carpet, vacuum weekly.   Use a dust mask and HEPA vacuum.   Pollen and Outdoor Mold  Some people are allergic to trees, grass, or weed pollen, or molds. Try to keep your windows closed.  Limit time out doors when pollen count is high.   Ask you health care provider about taking medicine during allergy season.     Animal Dander  Some people are  allergic to skin flakes, urine or saliva from pets with fur or feathers. Keep pets with fur or feathers out of your home.    If you can t keep the pet outdoors, then keep the pet out of your bedroom.  Keep the bedroom door closed.  Keep pets off cloth furniture and away from stuffed toys.     Mice, Rats, and Cockroaches   Some people are allergic to the waste from these pests.   Cover food and garbage.  Clean up spills and food crumbs.  Store grease in the refrigerator.   Keep food out of the bedroom.   Indoor Mold  This can be a trigger if your home has high moisture. Fix leaking faucets, pipes, or other sources of water.   Clean moldy surfaces.  Dehumidify basement if it is damp and smelly.   Smoke, Strong Odors, and Sprays  These can reduce air quality. Stay away from strong odors and sprays, such as perfume, powder, hair spray, paints, smoke incense, paint, cleaning products, candles and new carpet.   Exercise or Sports  Some people with asthma have this trigger. Be active!  Ask your doctor about taking medicine before sports or exercise to prevent symptoms.    Warm up for 5-10 minutes before and after sports or exercise.     Other Triggers of Asthma  Cold air:  Cover your nose and mouth with a scarf.  Sometimes laughing or crying can be a trigger.  Some medicines and food can trigger asthma.

## 2023-09-12 NOTE — PROGRESS NOTES
"   SUBJECTIVE:   CC: Lenora is an 33 year old who presents for preventive health visit.     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 Not in axilla or groin. Uses phisoderm. They are painful. Started in  as her Mirena was wearing off, Mirena was replaced and cysts continue. Has never seen derm    Needs asthma meds refills, ACT 23, no flares, will return for pneumonia vaccine, declines flu shot      2023     9:15 AM   Additional Questions   Roomed by juan avila   Accompanied by self           2022     7:56 AM 2022     8:06 AM 8/15/2023    12:42 PM   ACT Total Scores   ACT TOTAL SCORE (Goal Greater than or Equal to 20) 23 22 23   In the past 12 months, how many times did you visit the emergency room for your asthma without being admitted to the hospital? 0 0 0   In the past 12 months, how many times were you hospitalized overnight because of your asthma? 0 0 0      Healthy Habits:     Getting at least 3 servings of Calcium per day:  Yes    Bi-annual eye exam:  Yes    Dental care twice a year:  Yes    Sleep apnea or symptoms of sleep apnea:  None    Diet:  Regular (no restrictions)    Frequency of exercise:  4-5 days/week    Duration of exercise:  30-45 minutes    Taking medications regularly:  Yes    Medication side effects:  Not applicable    Additional concerns today:  No          Social History     Tobacco Use    Smoking status: Never    Smokeless tobacco: Never    Tobacco comments:     No second hand smoke at home 3/7/05   Substance Use Topics    Alcohol use: Not Currently     Comment: \"no\"             2023     9:12 AM   Alcohol Use   Prescreen: >3 drinks/day or >7 drinks/week? No     Reviewed orders with patient.  Reviewed " "health maintenance and updated orders accordingly - Yes      Breast Cancer Screenin/1/2022     7:56 AM   Breast CA Risk Assessment (FHS-7)   Do you have a family history of breast, colon, or ovarian cancer? No / Unknown         Patient under 40 years of age: Routine Mammogram Screening not recommended.   Pertinent mammograms are reviewed under the imaging tab.    History of abnormal Pap smear: NO - age 30-65 PAP every 5 years with negative HPV co-testing recommended      2010    12:00 AM   PAP / HPV   PAP (Historical) NIL      Reviewed and updated as needed this visit by clinical staff   Tobacco  Allergies  Meds              Reviewed and updated as needed this visit by Provider                     Review of Systems   Constitutional:  Negative for chills and fever.   HENT:  Negative for congestion, ear pain, hearing loss and sore throat.    Eyes:  Negative for pain and visual disturbance.   Respiratory:  Negative for cough and shortness of breath.    Cardiovascular:  Negative for chest pain, palpitations and peripheral edema.   Gastrointestinal:  Negative for abdominal pain, constipation, diarrhea, heartburn, hematochezia and nausea.   Breasts:  Negative for tenderness, breast mass and discharge.   Genitourinary:  Negative for dysuria, frequency, genital sores, hematuria, pelvic pain, urgency, vaginal bleeding and vaginal discharge.   Musculoskeletal:  Negative for arthralgias, joint swelling and myalgias.   Skin:  Negative for rash.   Neurological:  Negative for dizziness, weakness, headaches and paresthesias.   Psychiatric/Behavioral:  Negative for mood changes. The patient is not nervous/anxious.           OBJECTIVE:   /76 (BP Location: Right arm, Patient Position: Sitting)   Pulse 84   Temp 98.1  F (36.7  C)   Ht 1.607 m (5' 3.25\")   Wt 87.1 kg (192 lb 1.6 oz)   LMP  (LMP Unknown)   SpO2 98%   BMI 33.76 kg/m    Physical Exam  GENERAL: healthy, alert and no distress  EYES: Eyes " "grossly normal to inspection, PERRL and conjunctivae and sclerae normal  HENT: ear canals and TM's normal, nose and mouth without ulcers or lesions  NECK: no adenopathy, no asymmetry, masses, or scars and thyroid normal to palpation  RESP: lungs clear to auscultation - no rales, rhonchi or wheezes  BREAST: normal without masses, tenderness or nipple discharge and no palpable axillary masses or adenopathy  CV: regular rate and rhythm, normal S1 S2, no S3 or S4, no murmur, click or rub, no peripheral edema and peripheral pulses strong  ABDOMEN: soft, nontender, no hepatosplenomegaly, no masses and bowel sounds normal  MS: no gross musculoskeletal defects noted, no edema  NEURO: Normal strength and tone, mentation intact and speech normal  PSYCH: mentation appears normal, affect normal/bright  Scars and some inflammatory cysts on lower aspect both breasts, no deep masses palpable        ASSESSMENT/PLAN:       ICD-10-CM    1. Routine general medical examination at a health care facility  Z00.00       2. Mild persistent asthma without complication  J45.30 budesonide-formoterol (SYMBICORT) 160-4.5 MCG/ACT Inhaler     albuterol (VENTOLIN HFA) 108 (90 Base) MCG/ACT inhaler      3. Class 1 obesity without serious comorbidity with body mass index (BMI) of 33.0 to 33.9 in adult, unspecified obesity type  E66.9     Z68.33       4. Bilateral breast cysts  N60.01 Adult Dermatology Referral    N60.02                 COUNSELING:  Reviewed preventive health counseling, as reflected in patient instructions       Regular exercise       Healthy diet/nutrition       Vision screening       Pneumococcal Vaccine          Contraception       Colorectal Cancer Screening      BMI:   Estimated body mass index is 33.76 kg/m  as calculated from the following:    Height as of this encounter: 1.607 m (5' 3.25\").    Weight as of this encounter: 87.1 kg (192 lb 1.6 oz).         She reports that she has never smoked. She has never used smokeless " tobacco.          Alana Singh NP  [facility]

## 2023-09-12 NOTE — TELEPHONE ENCOUNTER
Central Prior Authorization Team   Phone: [phone]    PRIOR AUTHORIZATION DENIED    Medication: BUDESONIDE-FORMOTEROL FUMARATE 160-4.5 MCG/ACT IN AERO  Insurance Company: [employer] - Phone [phone] Fax [phone]  Denial Date: 9/12/2023  Denial Rational: COVERAGE IS EXCLUDED FROM PATIENT'S PHARMACY BENEFITS      Appeal Information: IF PROVIDER WOULD LIKE TO APPEAL THIS DECISION PLEASE PROVIDE PA TEAM WITH LETTER OF MEDICAL NECESSITY      Patient Notified: No

## 2023-09-13 RX ORDER — FLUTICASONE PROPIONATE AND SALMETEROL XINAFOATE 115; 21 UG/1; UG/1
2 AEROSOL, METERED RESPIRATORY (INHALATION) 2 TIMES DAILY
Qty: 12 G | Refills: 11 | Status: SHIPPED | OUTPATIENT
Start: 2023-09-13 | End: 2024-01-08

## 2023-09-13 NOTE — TELEPHONE ENCOUNTER
I am happy to order whatever steroid/beta agonist inhaler is on her formulary, or the least expensive one. Please find out what that is and t up rx.

## 2023-09-13 NOTE — TELEPHONE ENCOUNTER
Did you have something else in mind for patient to try? Otherwise, we will need to try and get approved with an appeal letter.

## 2023-10-18 DIAGNOSIS — J45.30 MILD PERSISTENT ASTHMA WITHOUT COMPLICATION: ICD-10-CM

## 2023-10-18 RX ORDER — BUDESONIDE AND FORMOTEROL FUMARATE DIHYDRATE 160; 4.5 UG/1; UG/1
2 AEROSOL RESPIRATORY (INHALATION) 2 TIMES DAILY
Qty: 10.2 G | Refills: 11 | OUTPATIENT
Start: 2023-10-18

## 2023-10-18 NOTE — TELEPHONE ENCOUNTER
Should have refills on file  Last Written Prescription Date: 9/12/23  Last Fill Quantity: 10.2g,  # refills: 11   Last office visit: 9/12/2023

## 2024-01-05 ENCOUNTER — TELEPHONE (OUTPATIENT)
Dept: FAMILY MEDICINE | Facility: CLINIC | Age: 34
End: 2024-01-05
Payer: COMMERCIAL

## 2024-01-05 DIAGNOSIS — J45.30 MILD PERSISTENT ASTHMA WITHOUT COMPLICATION: ICD-10-CM

## 2024-01-05 NOTE — TELEPHONE ENCOUNTER
New Medication Request    Contacts         Type Contact Phone/Fax    01/05/2024 04:09 PM CST Phone (Incoming) Lenora Simental (Self) [phone] (H)            What medication are you requesting?: Wixela & Brio-Ellipta    Reason for medication request: formulary Rx per pharmacy to replace Advair & Albuteral    Have you taken this medication before?: No    Controlled Substance Agreement on file:   CSA -- Patient Level:    CSA: None found at the patient level.         Patient offered an appointment? No    Preferred Pharmacy:   [id number] [place], [place] - [address] AT [address] [facility] [address]  [address] [address]  Phone: [phone] Fax: [phone]      Could we send this information to you in [facility] or would you prefer to receive a phone call?:   Patient would prefer a phone call   Okay to leave a detailed message?: Yes at Home number on file [phone] (home)

## 2024-01-06 RX ORDER — FLUTICASONE PROPIONATE AND SALMETEROL XINAFOATE 115; 21 UG/1; UG/1
2 AEROSOL, METERED RESPIRATORY (INHALATION) 2 TIMES DAILY
Qty: 12 G | Refills: 11 | Status: CANCELLED | OUTPATIENT
Start: 2024-01-06

## 2024-01-08 RX ORDER — FLUTICASONE FUROATE AND VILANTEROL 200; 25 UG/1; UG/1
1 POWDER RESPIRATORY (INHALATION) DAILY
Qty: 3 EACH | Refills: 4 | Status: SHIPPED | OUTPATIENT
Start: 2024-01-08

## 2024-03-21 ASSESSMENT — ASTHMA QUESTIONNAIRES
QUESTION_2 LAST FOUR WEEKS HOW OFTEN HAVE YOU HAD SHORTNESS OF BREATH: NOT AT ALL
QUESTION_4 LAST FOUR WEEKS HOW OFTEN HAVE YOU USED YOUR RESCUE INHALER OR NEBULIZER MEDICATION (SUCH AS ALBUTEROL): ONCE A WEEK OR LESS
QUESTION_5 LAST FOUR WEEKS HOW WOULD YOU RATE YOUR ASTHMA CONTROL: COMPLETELY CONTROLLED
QUESTION_3 LAST FOUR WEEKS HOW OFTEN DID YOUR ASTHMA SYMPTOMS (WHEEZING, COUGHING, SHORTNESS OF BREATH, CHEST TIGHTNESS OR PAIN) WAKE YOU UP AT NIGHT OR EARLIER THAN USUAL IN THE MORNING: NOT AT ALL
ACT_TOTALSCORE: 24
ACT_TOTALSCORE: 24
QUESTION_1 LAST FOUR WEEKS HOW MUCH OF THE TIME DID YOUR ASTHMA KEEP YOU FROM GETTING AS MUCH DONE AT WORK, SCHOOL OR AT HOME: NONE OF THE TIME

## 2024-03-22 ENCOUNTER — OFFICE VISIT (OUTPATIENT)
Dept: FAMILY MEDICINE | Facility: CLINIC | Age: 34
End: 2024-03-22
Payer: COMMERCIAL

## 2024-03-22 VITALS
OXYGEN SATURATION: 99 % | DIASTOLIC BLOOD PRESSURE: 86 MMHG | HEIGHT: 63 IN | HEART RATE: 85 BPM | TEMPERATURE: 98.9 F | BODY MASS INDEX: 30.28 KG/M2 | WEIGHT: 170.9 LBS | SYSTOLIC BLOOD PRESSURE: 120 MMHG

## 2024-03-22 DIAGNOSIS — R19.00 RIGHT FLANK MASS: Primary | ICD-10-CM

## 2024-03-22 PROCEDURE — 99213 OFFICE O/P EST LOW 20 MIN: CPT | Performed by: NURSE PRACTITIONER

## 2024-03-22 RX ORDER — DOXYCYCLINE 100 MG/1
1 TABLET ORAL 2 TIMES DAILY
COMMUNITY
Start: 2023-10-23 | End: 2024-03-22

## 2024-03-22 RX ORDER — CLINDAMYCIN PHOSPHATE 10 UG/ML
LOTION TOPICAL 2 TIMES DAILY
COMMUNITY
Start: 2023-10-23 | End: 2024-03-22

## 2024-03-22 NOTE — PROGRESS NOTES
"  Assessment & Plan     (R19.00) Right flank mass  (primary encounter diagnosis)  Comment:   Plan: US Biopsy Back Soft Tissue Superfical        Ultrasound with potential biopsy at earliest convenience             BMI  Estimated body mass index is 30.03 kg/m  as calculated from the following:    Height as of this encounter: 1.607 m (5' 3.25\").    Weight as of this encounter: 77.5 kg (170 lb 14.4 oz).             Kayleigh Cooley is a 33 year old, presenting for the following health issues: patient has a lump in right side of ribs that appears to be filled with fluid, slightly uncomfortable, not painful, no redness, just noticed yesterday  Mass  \"Lump\" on right side of torso below armpit that she noticed yesterday. Denies any recent trauma. No redness or discharge; denies pain.       3/22/2024     8:41 AM   Additional Questions   Roomed by juan avila   Accompanied by self     Mass    History of Present Illness       Reason for visit:  Fluid filled bump on right side near armpit/ribs  Symptom onset:  Today  Symptoms include:  None  Symptom intensity:  Mild  Symptom progression:  Staying the same  Had these symptoms before:  Yvonne consumes 0 sweetened beverage(s) daily.She exercises with enough effort to increase her heart rate 30 to 60 minutes per day.  She exercises with enough effort to increase her heart rate 4 days per week.   She is taking medications regularly.         9/1/2022     8:06 AM 8/15/2023    12:42 PM 3/21/2024     4:43 PM   ACT Total Scores   ACT TOTAL SCORE (Goal Greater than or Equal to 20) 22 23 24   In the past 12 months, how many times did you visit the emergency room for your asthma without being admitted to the hospital? 0 0 0   In the past 12 months, how many times were you hospitalized overnight because of your asthma? 0 0 0                  Objective    /86 (BP Location: Right arm, Patient Position: Sitting)   Pulse 85   Temp 98.9  F (37.2  C)   Ht 1.607 m (5' 3.25\")   Wt 77.5 kg " (170 lb 14.4 oz)   LMP  (LMP Unknown)   SpO2 99%   Breastfeeding No   BMI 30.03 kg/m    Body mass index is 30.03 kg/m .  Physical Exam   GENERAL: alert and no distress  SKIN: Non-tender, mobile fluid-like cyst/mass on right lateral torso below bra line measuring 8-9 cm. No erythema or induration. No ecchymosis. Edges are difficult to palpate.            Signed Electronically by: Alana Singh NP

## 2024-04-03 ENCOUNTER — HOSPITAL ENCOUNTER (OUTPATIENT)
Dept: ULTRASOUND IMAGING | Facility: CLINIC | Age: 34
Discharge: HOME | End: 2024-04-03
Attending: NURSE PRACTITIONER | Admitting: NURSE PRACTITIONER
Payer: COMMERCIAL

## 2024-04-03 DIAGNOSIS — R19.00 RIGHT FLANK MASS: ICD-10-CM

## 2024-04-03 PROCEDURE — 76942 ECHO GUIDE FOR BIOPSY: CPT

## 2024-04-03 PROCEDURE — 76705 ECHO EXAM OF ABDOMEN: CPT

## 2024-08-13 ENCOUNTER — PATIENT OUTREACH (OUTPATIENT)
Dept: CARE COORDINATION | Facility: CLINIC | Age: 34
End: 2024-08-13
Payer: COMMERCIAL

## 2024-08-27 ENCOUNTER — PATIENT OUTREACH (OUTPATIENT)
Dept: CARE COORDINATION | Facility: CLINIC | Age: 34
End: 2024-08-27
Payer: COMMERCIAL

## 2024-12-14 ENCOUNTER — HEALTH MAINTENANCE LETTER (OUTPATIENT)
Age: 34
End: 2024-12-14